# Patient Record
Sex: FEMALE | Race: WHITE | NOT HISPANIC OR LATINO | Employment: OTHER | URBAN - METROPOLITAN AREA
[De-identification: names, ages, dates, MRNs, and addresses within clinical notes are randomized per-mention and may not be internally consistent; named-entity substitution may affect disease eponyms.]

---

## 2017-12-05 ENCOUNTER — HISTORICAL (OUTPATIENT)
Dept: ADMINISTRATIVE | Facility: HOSPITAL | Age: 57
End: 2017-12-05

## 2021-03-25 ENCOUNTER — IMMUNIZATION (OUTPATIENT)
Dept: OBSTETRICS AND GYNECOLOGY | Facility: CLINIC | Age: 61
End: 2021-03-25
Payer: COMMERCIAL

## 2021-03-25 DIAGNOSIS — Z23 NEED FOR VACCINATION: Primary | ICD-10-CM

## 2021-03-25 PROCEDURE — 91300 COVID-19, MRNA, LNP-S, PF, 30 MCG/0.3 ML DOSE VACCINE: CPT | Mod: PBBFAC | Performed by: ANESTHESIOLOGY

## 2021-04-15 ENCOUNTER — IMMUNIZATION (OUTPATIENT)
Dept: OBSTETRICS AND GYNECOLOGY | Facility: CLINIC | Age: 61
End: 2021-04-15
Payer: COMMERCIAL

## 2021-04-15 DIAGNOSIS — Z23 NEED FOR VACCINATION: Primary | ICD-10-CM

## 2021-04-15 PROCEDURE — 0002A COVID-19, MRNA, LNP-S, PF, 30 MCG/0.3 ML DOSE VACCINE: CPT | Mod: CV19,,, | Performed by: ANESTHESIOLOGY

## 2021-04-15 PROCEDURE — 0002A COVID-19, MRNA, LNP-S, PF, 30 MCG/0.3 ML DOSE VACCINE: ICD-10-PCS | Mod: CV19,,, | Performed by: ANESTHESIOLOGY

## 2021-04-15 PROCEDURE — 91300 COVID-19, MRNA, LNP-S, PF, 30 MCG/0.3 ML DOSE VACCINE: CPT | Mod: ,,, | Performed by: ANESTHESIOLOGY

## 2021-04-15 PROCEDURE — 91300 COVID-19, MRNA, LNP-S, PF, 30 MCG/0.3 ML DOSE VACCINE: ICD-10-PCS | Mod: ,,, | Performed by: ANESTHESIOLOGY

## 2021-06-15 DIAGNOSIS — Z12.31 ENCOUNTER FOR SCREENING MAMMOGRAM FOR BREAST CANCER: Primary | ICD-10-CM

## 2021-06-17 ENCOUNTER — HOSPITAL ENCOUNTER (OUTPATIENT)
Dept: RADIOLOGY | Facility: HOSPITAL | Age: 61
Discharge: HOME | End: 2021-06-17
Attending: FAMILY MEDICINE
Payer: COMMERCIAL

## 2021-06-17 VITALS — HEIGHT: 61 IN | BODY MASS INDEX: 35.68 KG/M2 | WEIGHT: 189 LBS

## 2021-06-17 DIAGNOSIS — Z12.31 ENCOUNTER FOR SCREENING MAMMOGRAM FOR BREAST CANCER: ICD-10-CM

## 2021-06-17 PROCEDURE — 77067 SCR MAMMO BI INCL CAD: CPT | Mod: TC

## 2021-12-17 ENCOUNTER — IMMUNIZATION (OUTPATIENT)
Dept: OBSTETRICS AND GYNECOLOGY | Facility: CLINIC | Age: 61
End: 2021-12-17
Payer: OTHER GOVERNMENT

## 2021-12-17 DIAGNOSIS — Z23 NEED FOR VACCINATION: Primary | ICD-10-CM

## 2021-12-17 PROCEDURE — 0004A COVID-19, MRNA, LNP-S, PF, 30 MCG/0.3 ML DOSE VACCINE: CPT | Mod: PBBFAC | Performed by: ANESTHESIOLOGY

## 2022-04-07 ENCOUNTER — HISTORICAL (OUTPATIENT)
Dept: ADMINISTRATIVE | Facility: HOSPITAL | Age: 62
End: 2022-04-07
Payer: COMMERCIAL

## 2022-04-24 VITALS
DIASTOLIC BLOOD PRESSURE: 100 MMHG | SYSTOLIC BLOOD PRESSURE: 162 MMHG | HEIGHT: 61 IN | WEIGHT: 211 LBS | BODY MASS INDEX: 39.84 KG/M2

## 2022-05-01 NOTE — HISTORICAL [facility]
This is a historical note converted from Alex. Formatting and pictures may have been removed.  Please reference Alex for original formatting and attached multimedia. Chief Complaint  NEW PATIENT HERE FOR LEFT KNEE PAIN ON 10/14/17 WITH SWELLING AND STIFFNESS. 2 WEEKS LATER SHE HEARD A POP AFTER PUTTING HER SOCK  History of Present Illness  Knee symptoms ::knee gives way ?? ?Knee joint pain on the left laterally ??? ?Worse with weightbearing ?? ?Worse in the morning  ?? ?Slowly worsens with extended activity ?? ?Is increased by bending it ?? ?By twisting it ?? ?By kneeling ?? ?With stairs ?? ?By squatting  ?? ?Knee joint swelling on the left??????Medially ?? ?Laterally? ?  ? ?Knee joint pain not improved by rest ?? ?Not by ice? ?? ?No clicking sensation in the left knee ???? grating sensation in the left knee?  ?? ?The knee did not suddenly buckle due to contact ???? popping sound was heard in the knee?  ?? ?No tingling ?? ?No burning sensation,has had periods of stiffness  Review of Systems  Review of Systems  ????Constitutional: No fever, No chills.  ????Respiratory: No shortness of breath, No cough.  ????Cardiovascular: No chest pain.  ????Gastrointestinal: No nausea, No vomiting, No diarrhea, No constipation, No heartburn.  ????Genitourinary: No dysuria, No hematuria.  ????Hematology/Lymphatics: No bleeding tendency.  ????Endocrine: No polyuria.  ????Neurologic: Alert and oriented X4, No numbness, No tingling.  ????Psychiatric: No anxiety, No depression.  Physical Exam  Vitals & Measurements  HT:?154?cm? HT:?154?cm? WT:?95.70?kg? WT:?95.70?kg? BMI:?40.35?  PHYSICAL FINDINGS  Cardiovascular:  ? ?? Arterial Pulses: ? Dorsalis pedis pulses were normal right.? ? Dorsalis pedis pulses were normal left.  Musculoskeletal System:  ? ?? Hips:  ? ?? ?? ? General/bilateral: ? No swelling of the hips.? ? No induration of the hips.  ? ?? ?? ? Right Hip: ? Motion was normal.? ? No pain was elicited by active internal  rotation with the hip flexed.  ? ?? ?? ? ? No pain was elicited by active external rotation with the hip flexed.? ? No pain was elicited by active motion.? ? No pain was elicited by passive motion.  ? ?? ?? ? Left Hip: ? Motion was normal.  ? ?? Left Knee: ? Examined.? ? Positive effusion.? ?Localized swelling.? ?Genu varum.? ?Patella demonstrated crepitus.? ?Anteromedial aspect was tender on palpation.? ?Medial aspect was tender on palpation.  patella  Left Knee:  Knee Motion:? ?? ?? Value???????????  ? Active flexion? ?? ?? [125] degrees  Active extension? ? [00] degrees  ?   left knee ?? Pain was elicited throughout the range of motion.? ? Swelling with a negative fluctuation test.? ? No erythema.? ? No warmth.? ? Anterior aspect was not tender on palpation.? ? Patellofemoral region was not tender on palpation.? ? Medial collateral ligament was not tender on palpation.? ? Lateral collateral ligament was not tender on palpation.? ? No pain was elicited by motion using Nadia apprehension test.? ? No laxity of the posterior cruciate ligament.? ? No anterior drawer sign was present.? ? No one plane medial (straight) instability.? ? No one plane lateral (straight) instability.? ? A Lachman test did not demonstrate one plane anterior instability.? ? Clarkes sign was not observed for chondromalacia.  ?   Right Knee:  Knee Motion:? ?? ? Value? ?? ?? ?? ?? Normal Range  Active flexion? ?? ? [130] degrees  Active extension? ?[00] degrees  ?   Foot:  ? ?? Left Foot: ? No excessive pronation.? ? No excessive supination.  Functional Exam:  ? ?? General/bilateral: ? Ambulation did not require a cane.? ? Ambulation did not require a walker.  Neurological:  ? ?? Sensation: ? No sensory exam abnormalities were noted.  ? ?? Motor: ? Strength was normal.? ? No weakness of the right hip was observed.? ? No weakness of the left hip was observed.  ? ?? Gait and Stance: ? A left-sided antalgic gait was observed.  Skin:  Injury /  Incision Site: ? Left knee showed no tissue injury scar.  ?  ?   TESTS  Imaging:  X-Ray Knee:  AP and lateral view x-rays of the left knee with sunrise view of the patella were performed -of left knee.  ?   IMPRESSIONS RADIOLOGY TEST  Narrowing of the left knee joint space,not ?bone on bone, showed sclerosis of the left knee, and small osteophytes arising from the left knee.  will try mobic  Assessment/Plan  1.?Osteoarthritis of left knee  Ordered:  meloxicam, 7.5 mg = 1 tab(s), Oral, Daily, # 90 tab(s), 0 Refill(s), Pharmacy:  [facility] Follow up, *Est. 01/05/18 3:00:00 CST, Order for future visit, Osteoarthritis of left knee, [facility]  Office/Outpatient Visit Level 4 New [id number] PC, Osteoarthritis of left knee, [facility], 12/05/17 9:36:00 CST  ?  Left knee pain  Ordered:  XR Knee Left 3 Views, Routine, 12/05/17 8:42:00 CST, Pain, None, Ambulatory, Rad Type, Left knee pain, 12/05/17 8:42:00 CST  ?   Problem List/Past Medical History  Ongoing  High cholesterol  Hypertension  Morbid obesity  Osteoarthritis of left knee  Historical  Medications  Atorvastatin 40 Mg Tablet, 40 mg= 1 tab(s), Oral, qAM  BISOPRL/HCTZ TAB 5-6.25MG, 1 tab(s), Oral, qAM  meloxicam 7.5 mg oral tablet, 7.5 mg= 1 tab(s), Oral, Daily  TRIAMT/HCTZ TAB 75-50MG, Oral  Allergies  No Known Allergies  Social History  Alcohol - 12/05/2017  Never  Substance Abuse - 12/05/2017  Never  Tobacco - 12/05/2017  Never smoker  Family History  Cancer: Father.  Heart: Father.  Hypertension.: Mother and Father.

## 2022-07-06 ENCOUNTER — HOSPITAL ENCOUNTER (OUTPATIENT)
Dept: RADIOLOGY | Facility: CLINIC | Age: 62
Discharge: HOME | End: 2022-07-06
Attending: SPECIALIST
Payer: COMMERCIAL

## 2022-07-06 ENCOUNTER — OFFICE VISIT (OUTPATIENT)
Dept: ORTHOPEDICS | Facility: CLINIC | Age: 62
End: 2022-07-06
Payer: COMMERCIAL

## 2022-07-06 VITALS — BODY MASS INDEX: 36.63 KG/M2 | WEIGHT: 194 LBS | HEIGHT: 61 IN

## 2022-07-06 DIAGNOSIS — M17.12 PRIMARY LOCALIZED OSTEOARTHROSIS OF THE KNEE, LEFT: ICD-10-CM

## 2022-07-06 DIAGNOSIS — M25.562 LEFT KNEE PAIN, UNSPECIFIED CHRONICITY: ICD-10-CM

## 2022-07-06 DIAGNOSIS — M25.562 LEFT KNEE PAIN, UNSPECIFIED CHRONICITY: Primary | ICD-10-CM

## 2022-07-06 PROCEDURE — 1160F RVW MEDS BY RX/DR IN RCRD: CPT | Mod: CPTII,,, | Performed by: SPECIALIST

## 2022-07-06 PROCEDURE — 99213 PR OFFICE/OUTPT VISIT, EST, LEVL III, 20-29 MIN: ICD-10-PCS | Mod: 25,,, | Performed by: SPECIALIST

## 2022-07-06 PROCEDURE — 3008F PR BODY MASS INDEX (BMI) DOCUMENTED: ICD-10-PCS | Mod: CPTII,,, | Performed by: SPECIALIST

## 2022-07-06 PROCEDURE — 1159F PR MEDICATION LIST DOCUMENTED IN MEDICAL RECORD: ICD-10-PCS | Mod: CPTII,,, | Performed by: SPECIALIST

## 2022-07-06 PROCEDURE — 99213 OFFICE O/P EST LOW 20 MIN: CPT | Mod: 25,,, | Performed by: SPECIALIST

## 2022-07-06 PROCEDURE — 73564 XR KNEE COMP 4 OR MORE VIEWS LEFT: ICD-10-PCS | Mod: LT,,, | Performed by: SPECIALIST

## 2022-07-06 PROCEDURE — 1160F PR REVIEW ALL MEDS BY PRESCRIBER/CLIN PHARMACIST DOCUMENTED: ICD-10-PCS | Mod: CPTII,,, | Performed by: SPECIALIST

## 2022-07-06 PROCEDURE — 20610 LARGE JOINT ASPIRATION/INJECTION: L KNEE: ICD-10-PCS | Mod: LT,,, | Performed by: SPECIALIST

## 2022-07-06 PROCEDURE — 1159F MED LIST DOCD IN RCRD: CPT | Mod: CPTII,,, | Performed by: SPECIALIST

## 2022-07-06 PROCEDURE — 20610 DRAIN/INJ JOINT/BURSA W/O US: CPT | Mod: LT,,, | Performed by: SPECIALIST

## 2022-07-06 PROCEDURE — 3008F BODY MASS INDEX DOCD: CPT | Mod: CPTII,,, | Performed by: SPECIALIST

## 2022-07-06 PROCEDURE — 73564 X-RAY EXAM KNEE 4 OR MORE: CPT | Mod: LT,,, | Performed by: SPECIALIST

## 2022-07-06 RX ORDER — BETAMETHASONE SODIUM PHOSPHATE AND BETAMETHASONE ACETATE 3; 3 MG/ML; MG/ML
12 INJECTION, SUSPENSION INTRA-ARTICULAR; INTRALESIONAL; INTRAMUSCULAR; SOFT TISSUE
Status: DISCONTINUED | OUTPATIENT
Start: 2022-07-06 | End: 2022-07-06 | Stop reason: HOSPADM

## 2022-07-06 RX ORDER — LIDOCAINE HYDROCHLORIDE 20 MG/ML
3 INJECTION, SOLUTION INFILTRATION; PERINEURAL
Status: DISCONTINUED | OUTPATIENT
Start: 2022-07-06 | End: 2022-07-06 | Stop reason: HOSPADM

## 2022-07-06 RX ORDER — TRIAMTERENE AND HYDROCHLOROTHIAZIDE 75; 50 MG/1; MG/1
1 TABLET ORAL DAILY
COMMUNITY
Start: 2022-06-06

## 2022-07-06 RX ORDER — METOPROLOL SUCCINATE 50 MG/1
50 TABLET, EXTENDED RELEASE ORAL DAILY
COMMUNITY
Start: 2022-05-09

## 2022-07-06 RX ADMIN — LIDOCAINE HYDROCHLORIDE 3 MG: 20 INJECTION, SOLUTION INFILTRATION; PERINEURAL at 10:07

## 2022-07-06 RX ADMIN — BETAMETHASONE SODIUM PHOSPHATE AND BETAMETHASONE ACETATE 12 MG: 3; 3 INJECTION, SUSPENSION INTRA-ARTICULAR; INTRALESIONAL; INTRAMUSCULAR; SOFT TISSUE at 10:07

## 2022-07-06 NOTE — PROGRESS NOTES
Chief Complaint:   Chief Complaint   Patient presents with    Knee Pain     LEFT KNEE, AFTER LAST INJECTION WHICH WAS A YEAR AGO WITH RELIEF, A MONTH AGO OVERDOING IT WHILE CUTTING GRASS SHE GRADUALLY DEVELOPED PAIN FROM THE LATERAL SIDE,          History of present illness:    This is a 62 y.o. year old female who complains of LEFT Knee symptoms ::knee gives way  Knee joint pain on the left  Worse with weightbearing  Worse in the morning   Slowly worsens with extended activity  Is increased by bending it  By twisting it  By kneeling  With stairs  By squatting   Knee joint swelling on the left posteriorly  Medially  Laterally °  Knee joint pain not improved by rest ° Not by ice ° No clicking sensation in the left knee ° No grating sensation in the left knee   ° The knee did not suddenly buckle due to contact ° No popping sound was heard in the knee   ° No tingling ° No burning sensation        Past Medical History:   Diagnosis Date    High cholesterol     HTN (hypertension)     Obesity, unspecified     Osteoarthritis of left knee        History reviewed. No pertinent surgical history.    Current Outpatient Medications   Medication Instructions    metoprolol succinate (TOPROL-XL) 50 mg, Oral, Daily    triamterene-hydrochlorothiazide 75-50 mg (MAXZIDE) 75-50 mg per tablet 1 tablet, Oral, Daily        Review of patient's allergies indicates:  No Known Allergies    Family History   Problem Relation Age of Onset    No Known Problems Mother     No Known Problems Father     No Known Problems Sister     No Known Problems Daughter     No Known Problems Maternal Aunt     No Known Problems Maternal Uncle     No Known Problems Paternal Aunt     No Known Problems Paternal Uncle     No Known Problems Maternal Grandmother     No Known Problems Maternal Grandfather     No Known Problems Paternal Grandmother     No Known Problems Paternal Grandfather     Breast cancer Neg Hx     Ovarian cancer Neg Hx  "    BRCA 1/2 Neg Hx            Review of Systems:    Constitution:   Denies chills, fever, and sweats.  HENT:   Denies headaches or blurry vision.  Cardiovascular:  Denies chest pain or irregular heart beat.  Respiratory:   Denies cough or shortness of breath.  Gastrointestinal:  Denies abdominal pain, nausea, or vomiting.  Musculoskeletal:   Denies muscle cramps.  Neurological:   Denies dizziness or focal weakness.  Psychiatric/Behavior: Normal mental status.  Hematology/Lymph:  Denies bleeding problem or easy bruising/bleeding.  Skin:    Denies rash or suspicious lesions.    Examination:    Vital Signs:    Vitals:    07/06/22 1010   Weight: 88 kg (194 lb)   Height: 5' 1" (1.549 m)       Body mass index is 36.66 kg/m².    Constitution:   Well-developed, well nourished patient in no acute distress.  Neurological:   Alert and oriented x 3 and cooperative to examination.     Psychiatric/Behavior: Normal mental status.  Respiratory:   No shortness of breath.  Eyes:    Extraoccular muscles intact  Skin:    No scars, rash or suspicious lesions.    Musculoskeletal Exam :   PHYSICAL FINDINGS  Cardiovascular:  Arterial Pulses: ° Dorsalis pedis pulses were normal right. ° Dorsalis pedis pulses were normal left.  Musculoskeletal System:  Hips:  General/bilateral: ° No swelling of the hips. ° No induration of the hips.  Right Hip: ° Motion was normal. ° No pain was elicited by active internal rotation with the hip flexed.  ° No pain was elicited by active external rotation with the hip flexed. ° No pain was elicited by active motion. ° No pain was elicited by passive motion.  Left Hip: ° Motion was normal.  Left Knee: ° Examined. ° Positive effusion. °Localized swelling. °Genu varum. °Patella demonstrated crepitus. °Anteromedial aspect was tender on palpation. °Medial aspect was tender on palpation.  patella  Left Knee:  Knee Motion: Value   Active flexion     120 degrees  Active extension    5  degrees    left knee ° Pain " was elicited throughout the range of motion. ° Swelling with a negative fluctuation test. ° No erythema. ° No warmth. ° Anterior aspect was not tender on palpation. ° Patellofemoral region was not tender on palpation. ° Medial collateral ligament was not tender on palpation. ° Lateral collateral ligament was not tender on palpation. ° No pain was elicited by motion using [place]'s apprehension test. ° No laxity of the posterior cruciate ligament. ° No anterior drawer sign was present. ° No one plane medial (straight) instability. ° No one plane lateral (straight) instability. ° A Lachman test did not demonstrate one plane anterior instability. ° Villalba's sign was not observed for chondromalacia.      TESTS  Imaging:  X-Ray Knee:  AP and lateral view x-rays of the left knee with sunrise view of the patella were performed -of left knee.    IMPRESSIONS RADIOLOGY TEST   VARUS ALIGNMENT WITH BONE ON BONE MEDIAL AND PATELLOFEMORAL     Assessment: Left knee pain, unspecified chronicity  -     X-Ray Knee Complete 4 or More Views Left; Future; Expected date: 07/06/2022        Plan:  NSAIDS  Modify activity  Left knee injection        DISCLAIMER: This note may have been dictated using voice recognition software and may contain grammatical errors.     NOTE: Consult report sent to referring provider via [facility]MR.

## 2022-07-06 NOTE — PROCEDURES
Large Joint Aspiration/Injection: L knee    Date/Time: 7/6/2022 10:00 AM  Performed by: Bruce Bal MD  Authorized by: Bruce Bal MD     Consent Done?:  Yes (Verbal)  Indications:  Arthritis  Timeout: prior to procedure the correct patient, procedure, and site was verified    Prep: patient was prepped and draped in usual sterile fashion      Local anesthesia used?: Yes    Local anesthetic:  Lidocaine 2% without epinephrine    Details:  Needle Size:  25 G  Ultrasonic Guidance for needle placement?: No    Location:  Knee  Site:  L knee  Medications:  12 mg betamethasone acetate-betamethasone sodium phosphate 6 mg/mL; 3 mg LIDOcaine HCL 20 mg/ml (2%) 20 mg/mL (2 %)  Patient tolerance:  Patient tolerated the procedure well with no immediate complications

## 2022-08-24 DIAGNOSIS — Z12.31 ENCOUNTER FOR SCREENING MAMMOGRAM FOR MALIGNANT NEOPLASM OF BREAST: Primary | ICD-10-CM

## 2022-08-31 ENCOUNTER — HOSPITAL ENCOUNTER (OUTPATIENT)
Dept: RADIOLOGY | Facility: HOSPITAL | Age: 62
Discharge: HOME | End: 2022-08-31
Attending: FAMILY MEDICINE
Payer: COMMERCIAL

## 2022-08-31 VITALS — BODY MASS INDEX: 36.63 KG/M2 | HEIGHT: 61 IN | WEIGHT: 194 LBS

## 2022-08-31 DIAGNOSIS — Z12.31 ENCOUNTER FOR SCREENING MAMMOGRAM FOR MALIGNANT NEOPLASM OF BREAST: ICD-10-CM

## 2022-08-31 PROCEDURE — 77067 SCR MAMMO BI INCL CAD: CPT | Mod: TC

## 2022-10-19 ENCOUNTER — OFFICE VISIT (OUTPATIENT)
Dept: ORTHOPEDICS | Facility: CLINIC | Age: 62
End: 2022-10-19
Payer: COMMERCIAL

## 2022-10-19 DIAGNOSIS — S92.351A CLOSED FRACTURE OF BASE OF FIFTH METATARSAL BONE, RIGHT, INITIAL ENCOUNTER: Primary | ICD-10-CM

## 2022-10-19 PROCEDURE — 99999 PR PBB SHADOW E&M-EST. PATIENT-LVL II: ICD-10-PCS | Mod: PBBFAC,,, | Performed by: NURSE PRACTITIONER

## 2022-10-19 PROCEDURE — 1159F MED LIST DOCD IN RCRD: CPT | Mod: CPTII,S$GLB,, | Performed by: NURSE PRACTITIONER

## 2022-10-19 PROCEDURE — 28470 CLTX METATARSAL FX WO MNP EA: CPT | Mod: RT,S$GLB,, | Performed by: NURSE PRACTITIONER

## 2022-10-19 PROCEDURE — 1160F PR REVIEW ALL MEDS BY PRESCRIBER/CLIN PHARMACIST DOCUMENTED: ICD-10-PCS | Mod: CPTII,S$GLB,, | Performed by: NURSE PRACTITIONER

## 2022-10-19 PROCEDURE — 28470 PR CLOSED RX METATARSAL FX: ICD-10-PCS | Mod: RT,S$GLB,, | Performed by: NURSE PRACTITIONER

## 2022-10-19 PROCEDURE — 99499 NO LOS: ICD-10-PCS | Mod: S$GLB,,, | Performed by: NURSE PRACTITIONER

## 2022-10-19 PROCEDURE — 1159F PR MEDICATION LIST DOCUMENTED IN MEDICAL RECORD: ICD-10-PCS | Mod: CPTII,S$GLB,, | Performed by: NURSE PRACTITIONER

## 2022-10-19 PROCEDURE — 99999 PR PBB SHADOW E&M-EST. PATIENT-LVL II: CPT | Mod: PBBFAC,,, | Performed by: NURSE PRACTITIONER

## 2022-10-19 PROCEDURE — 99499 UNLISTED E&M SERVICE: CPT | Mod: S$GLB,,, | Performed by: NURSE PRACTITIONER

## 2022-10-19 PROCEDURE — 1160F RVW MEDS BY RX/DR IN RCRD: CPT | Mod: CPTII,S$GLB,, | Performed by: NURSE PRACTITIONER

## 2022-10-19 NOTE — PROGRESS NOTES
Subjective:      Beulah Aden is a 62 y.o. female who presents for treatment of a right foot fracture. She is referred by Dr Lofton. Onset of injury was on 10/16/22. She states that she stepped out out of her truck and felt a pop in her right foot. She had immediate pain. She self treated for a few days and saw her PCP earlier today. She had radiographs done and a nondisplaced fracture of the base of the fifth metatarsal was seen. She was referred to orthopedics. She presents and rates her pain as 4/10 over the lateral mid-foot. She reports minimal bruising and mild swelling. Current symptoms include: ability to bear weight, but with some pain. Aggravating factors: any weight bearing. Symptoms have been well-controlled. Patient has had no prior foot problems. Evaluation to date: plain films: abnormal. Treatment to date: none.      Review of Systems  Review of Systems   Constitutional: Negative.  Negative for fever.   Musculoskeletal:  Positive for joint pain (RT foot).   Skin: Negative.    Neurological: Negative.    Psychiatric/Behavioral: Negative.     All other systems reviewed and are negative.    Objective:   Neurologically intact.    Gait Antalgic  Right foot:  Tenderness of the base of the 5th metatarsal.  Ecchymoses and swelling of the dorsal foot.  AROM of the foot is limited.   No ankle tenderness or instability.   Left foot:  normal exam, no swelling, tenderness, instability; ligaments intact, full range of motion of all ankle/foot joints     Cap refill brisk    Imaging:  X-ray of the right foot were done off site and reviewed.   Nondisplaced fracture of the base of the fifth metatarsal.    Assessment:   Nondisplaced fracture of the base of the fifth metatarsal.    Plan:     Natural history and expected course discussed. Questions answered.  NWB with crutches x 2 weeks. Placed in a walker boot for fracture immobilization. Instructed on usage.   Rest, ice, compression, and elevation.  OTC  analgesics as needed.  RTC one week for radiographs and follow up.

## 2022-10-20 DIAGNOSIS — Z12.11 SPECIAL SCREENING FOR MALIGNANT NEOPLASMS, COLON: Primary | ICD-10-CM

## 2022-10-20 RX ORDER — SODIUM CHLORIDE 0.9 % (FLUSH) 0.9 %
10 SYRINGE (ML) INJECTION
Status: CANCELLED | OUTPATIENT
Start: 2022-10-20

## 2022-10-20 RX ORDER — SODIUM CHLORIDE 9 MG/ML
INJECTION, SOLUTION INTRAVENOUS CONTINUOUS
Status: CANCELLED | OUTPATIENT
Start: 2022-10-20

## 2022-10-24 DIAGNOSIS — S92.351A CLOSED FRACTURE OF BASE OF FIFTH METATARSAL BONE, RIGHT, INITIAL ENCOUNTER: Primary | ICD-10-CM

## 2022-10-25 ENCOUNTER — HOSPITAL ENCOUNTER (OUTPATIENT)
Dept: RADIOLOGY | Facility: HOSPITAL | Age: 62
Discharge: HOME | End: 2022-10-25
Attending: NURSE PRACTITIONER
Payer: COMMERCIAL

## 2022-10-25 ENCOUNTER — OFFICE VISIT (OUTPATIENT)
Dept: ORTHOPEDICS | Facility: CLINIC | Age: 62
End: 2022-10-25
Payer: COMMERCIAL

## 2022-10-25 DIAGNOSIS — S92.351A CLOSED FRACTURE OF BASE OF FIFTH METATARSAL BONE, RIGHT, INITIAL ENCOUNTER: Primary | ICD-10-CM

## 2022-10-25 DIAGNOSIS — S92.351A CLOSED FRACTURE OF BASE OF FIFTH METATARSAL BONE, RIGHT, INITIAL ENCOUNTER: ICD-10-CM

## 2022-10-25 PROCEDURE — 73630 X-RAY EXAM OF FOOT: CPT | Mod: TC,RT

## 2022-10-25 PROCEDURE — 99999 PR PBB SHADOW E&M-EST. PATIENT-LVL II: ICD-10-PCS | Mod: PBBFAC,,, | Performed by: NURSE PRACTITIONER

## 2022-10-25 PROCEDURE — 1159F MED LIST DOCD IN RCRD: CPT | Mod: CPTII,S$GLB,, | Performed by: NURSE PRACTITIONER

## 2022-10-25 PROCEDURE — 1159F PR MEDICATION LIST DOCUMENTED IN MEDICAL RECORD: ICD-10-PCS | Mod: CPTII,S$GLB,, | Performed by: NURSE PRACTITIONER

## 2022-10-25 PROCEDURE — 1160F PR REVIEW ALL MEDS BY PRESCRIBER/CLIN PHARMACIST DOCUMENTED: ICD-10-PCS | Mod: CPTII,S$GLB,, | Performed by: NURSE PRACTITIONER

## 2022-10-25 PROCEDURE — 99024 POSTOP FOLLOW-UP VISIT: CPT | Mod: S$GLB,,, | Performed by: NURSE PRACTITIONER

## 2022-10-25 PROCEDURE — 1160F RVW MEDS BY RX/DR IN RCRD: CPT | Mod: CPTII,S$GLB,, | Performed by: NURSE PRACTITIONER

## 2022-10-25 PROCEDURE — 99024 PR POST-OP FOLLOW-UP VISIT: ICD-10-PCS | Mod: S$GLB,,, | Performed by: NURSE PRACTITIONER

## 2022-10-25 PROCEDURE — 99999 PR PBB SHADOW E&M-EST. PATIENT-LVL II: CPT | Mod: PBBFAC,,, | Performed by: NURSE PRACTITIONER

## 2022-10-25 NOTE — PROGRESS NOTES
Subjective:      Follow up: RT foot FX     Beulah Aden is a 62 y.o. female who presents for follow up for a right fifth metatarsal fracture. Onset of injury was on 10/16/22. She presents in her walking boot and has been non-weight bearing. She presents and rates her pain as 2/10 over the lateral mid-foot. She reports minimal swelling. Aggravating factors: any weight bearing. Symptoms have been well-controlled.       Review of Systems  Review of Systems   Constitutional: Negative.  Negative for fever.   Musculoskeletal:  Positive for joint pain (RT foot).   Skin: Negative.    Neurological: Negative.    Psychiatric/Behavioral: Negative.     All other systems reviewed and are negative.     Objective:   Neurologically intact.     Gait Antalgic  Right foot:  Tenderness of the base of the 5th metatarsal.  Ecchymoses and swelling of the dorsal foot has decreased  AROM of the foot is unchanged  No ankle tenderness or instability.   Left foot:  normal exam, no swelling, tenderness, instability; ligaments intact, full range of motion of all ankle/foot joints      Cap refill brisk     Imaging:  X-ray of the right foot were done off site and reviewed.   Nondisplaced fracture proximal 5th metatarsal.     Assessment:   Nondisplaced fracture of the base of the fifth metatarsal.     Plan:      Radiographs are stable, will monitor closely. Will make referral to foot specialty for any changes.   NWB with crutches as directed. Walker boot as previous.   Rest, ice, compression, and elevation.  OTC analgesics as needed.  RTC one week for radiographs.

## 2022-10-28 ENCOUNTER — HOSPITAL ENCOUNTER (OUTPATIENT)
Dept: PREADMISSION TESTING | Facility: HOSPITAL | Age: 62
Discharge: HOME | End: 2022-10-28
Attending: SURGERY
Payer: COMMERCIAL

## 2022-10-28 ENCOUNTER — ANESTHESIA EVENT (OUTPATIENT)
Dept: ENDOSCOPY | Facility: HOSPITAL | Age: 62
End: 2022-10-28
Payer: COMMERCIAL

## 2022-10-28 VITALS — HEIGHT: 61 IN | WEIGHT: 189 LBS | BODY MASS INDEX: 35.68 KG/M2

## 2022-10-28 RX ORDER — PRAVASTATIN SODIUM 20 MG/1
20 TABLET ORAL DAILY
COMMUNITY

## 2022-10-28 NOTE — PRE-PROCEDURE INSTRUCTIONS
PT SAW DR GARCIA 8/9/22 FOR THE FIRST TIME FOR ESTABLISHED CARE. EKG DONE AT DR GARCIA'S OFFICE. RECEIVED NOTE FROM DR HERNANDEZ OFFICE WITH EKG INTERPRETATION. REVIEWED WITH DR AYERS. NO INTERVENTIONS NEEDED.

## 2022-10-31 NOTE — ANESTHESIA PREPROCEDURE EVALUATION
10/31/2022  Beulah Aden is a 62 y.o., female.      Pre-op Assessment    I have reviewed the Patient Summary Reports.    I have reviewed the NPO Status.   I have reviewed the Medications.     Review of Systems  Anesthesia Hx:  No problems with previous Anesthesia  Denies Family Hx of Anesthesia complications.   Denies Personal Hx of Anesthesia complications.   Social:  Non-Smoker    Cardiovascular:  Cardiovascular Normal Hypertension, well controlled     Pulmonary:  Pulmonary Normal    Renal/:  Renal/ Normal     Hepatic/GI:  Hepatic/GI Normal    Neurological:  Neurology Normal    Endocrine:  Endocrine Normal      Lab Results   Component Value Date    WBC 7.45 10/28/2022    HGB 14.1 10/28/2022    HCT 41.1 10/28/2022    MCV 87 10/28/2022     10/28/2022     CMP  Sodium   Date Value Ref Range Status   10/28/2022 140 136 - 145 mmol/L Final     Potassium   Date Value Ref Range Status   10/28/2022 3.3 (L) 3.5 - 5.1 mmol/L Final     Chloride   Date Value Ref Range Status   10/28/2022 101 95 - 110 mmol/L Final     CO2   Date Value Ref Range Status   10/28/2022 30 (H) 23 - 29 mmol/L Final     Glucose   Date Value Ref Range Status   10/28/2022 118 (H) 70 - 110 mg/dL Final     BUN   Date Value Ref Range Status   10/28/2022 21 8 - 23 mg/dL Final     Creatinine   Date Value Ref Range Status   10/28/2022 1.1 0.5 - 1.4 mg/dL Final     Calcium   Date Value Ref Range Status   10/28/2022 9.4 8.7 - 10.5 mg/dL Final     Total Protein   Date Value Ref Range Status   10/28/2022 7.0 6.0 - 8.4 g/dL Final     Albumin   Date Value Ref Range Status   10/28/2022 3.9 3.5 - 5.2 g/dL Final     Total Bilirubin   Date Value Ref Range Status   10/28/2022 0.7 0.1 - 1.0 mg/dL Final     Comment:     For infants and newborns, interpretation of results should be based  on gestational age, weight and in agreement with  clinical  observations.    Premature Infant recommended reference ranges:  Up to 24 hours.............<8.0 mg/dL  Up to 48 hours............<12.0 mg/dL  3-5 days..................<15.0 mg/dL  6-29 days.................<15.0 mg/dL    For patients on Eltrombopag therapy, use of Dimension [place] TBIL is   not   recommended.       Alkaline Phosphatase   Date Value Ref Range Status   10/28/2022 66 55 - 135 U/L Final     AST   Date Value Ref Range Status   10/28/2022 24 10 - 40 U/L Final     ALT   Date Value Ref Range Status   10/28/2022 25 10 - 44 U/L Final     Anion Gap   Date Value Ref Range Status   10/28/2022 9 8 - 16 mmol/L Final       Physical Exam  General: Well nourished    Airway:  Mallampati: II / I  Mouth Opening: Normal  TM Distance: Normal  Tongue: Normal  Neck ROM: Normal ROM    Dental:  Intact    Chest/Lungs:  Clear to auscultation    Heart:  Rate: Normal  Rhythm: Regular Rhythm  Sounds: Normal        Anesthesia Plan  Type of Anesthesia, risks & benefits discussed:    Anesthesia Type: MAC  Intra-op Monitoring Plan: Standard ASA Monitors  Post Op Pain Control Plan: multimodal analgesia  Induction:  IV  Airway Plan: Direct  Informed Consent: Informed consent signed with the Patient and all parties understand the risks and agree with anesthesia plan.  All questions answered.   ASA Score: 2  Day of Surgery Review of History & Physical: I have interviewed and examined the patient. I have reviewed the patient's H&P dated: There are no significant changes.     Ready For Surgery From Anesthesia Perspective.     .

## 2022-11-01 ENCOUNTER — HOSPITAL ENCOUNTER (OUTPATIENT)
Facility: HOSPITAL | Age: 62
Discharge: HOME | End: 2022-11-01
Attending: SURGERY | Admitting: SURGERY
Payer: COMMERCIAL

## 2022-11-01 ENCOUNTER — ANESTHESIA (OUTPATIENT)
Dept: ENDOSCOPY | Facility: HOSPITAL | Age: 62
End: 2022-11-01
Payer: COMMERCIAL

## 2022-11-01 VITALS
RESPIRATION RATE: 20 BRPM | DIASTOLIC BLOOD PRESSURE: 79 MMHG | HEART RATE: 62 BPM | OXYGEN SATURATION: 99 % | TEMPERATURE: 98 F | SYSTOLIC BLOOD PRESSURE: 136 MMHG

## 2022-11-01 DIAGNOSIS — Z12.11 SPECIAL SCREENING FOR MALIGNANT NEOPLASMS, COLON: Primary | ICD-10-CM

## 2022-11-01 DIAGNOSIS — K57.30 DIVERTICULOSIS LARGE INTESTINE W/O PERFORATION OR ABSCESS W/O BLEEDING: ICD-10-CM

## 2022-11-01 PROCEDURE — 37000009 HC ANESTHESIA EA ADD 15 MINS: Performed by: SURGERY

## 2022-11-01 PROCEDURE — G0121 COLON CA SCRN NOT HI RSK IND: HCPCS | Performed by: SURGERY

## 2022-11-01 PROCEDURE — 25000003 PHARM REV CODE 250: Performed by: SURGERY

## 2022-11-01 PROCEDURE — 37000008 HC ANESTHESIA 1ST 15 MINUTES: Performed by: SURGERY

## 2022-11-01 RX ORDER — SODIUM CHLORIDE 0.9 % (FLUSH) 0.9 %
10 SYRINGE (ML) INJECTION
Status: DISCONTINUED | OUTPATIENT
Start: 2022-11-01 | End: 2022-11-01 | Stop reason: HOSPADM

## 2022-11-01 RX ORDER — SODIUM CHLORIDE 9 MG/ML
INJECTION, SOLUTION INTRAVENOUS CONTINUOUS
Status: CANCELLED | OUTPATIENT
Start: 2022-11-01

## 2022-11-01 RX ORDER — PROPOFOL 10 MG/ML
VIAL (ML) INTRAVENOUS
Status: DISCONTINUED | OUTPATIENT
Start: 2022-11-01 | End: 2022-11-01

## 2022-11-01 RX ORDER — SODIUM CHLORIDE 9 MG/ML
INJECTION, SOLUTION INTRAVENOUS CONTINUOUS
Status: DISCONTINUED | OUTPATIENT
Start: 2022-11-01 | End: 2022-11-01 | Stop reason: HOSPADM

## 2022-11-01 RX ADMIN — Medication 100 MG: at 08:11

## 2022-11-01 RX ADMIN — Medication 50 MG: at 08:11

## 2022-11-01 RX ADMIN — SODIUM CHLORIDE: 0.9 INJECTION, SOLUTION INTRAVENOUS at 07:11

## 2022-11-01 NOTE — DISCHARGE INSTRUCTIONS
FOLLOW UP WITH DR CARLSON AS SCHEDULED.10-14 DAYS    REST TODAY RESUME ACTIVITY AS TOLERATED TOMORROW.    RESUME HOME MEDICATIONS.  RESUME CURRENT DIET    NO DRIVING OR DRINKING ALCOHOL FOR 24 HOURS.    CALL DR CARLSON'S OFFICE FOR ANY QUESTIONS OR CONCERNS.  REPORT TO THE ER IF URGENT.    THANK YOU FOR CHOOSING [facility]!

## 2022-11-01 NOTE — OP NOTE
[place] - Endoscopy  Colonoscopy Procedure  Operative Note    SUMMARY     Date of Procedure: 11/1/2022     Procedure: Procedure(s) (LRB):  COLONOSCOPY (N/A)    Surgeon(s) and Role:     * Noelle Martínez MD - Primary    Assisting Surgeon: None     Patient location: GI    Pre-Operative Diagnosis: Special screening for malignant neoplasms, colon [Z12.11]    Post-Operative Diagnosis: Post-Op Diagnosis Codes:     * Special screening for malignant neoplasms, colon [Z12.11]  Diverticular disease     Indications:  Screening age for colon cancer          Procedure:                  The patient was brought in to the endoscopy suite where the risks, benefits, and alternatives of the procedure were described.  The patient was given the opportunity to ask questions and then signed informed consent.  Patient was positioned in the left lateral decubitus position, continuous monitoring was initiated, and supplemental oxygen was provided via nasal cannula.  Adequate sedation was achieved with the above mentioned medications and then titrated during the entire procedure.  Digital rectal exam was performed.  Under direct visualization the colonoscope was introduced through the anus in to the rectum.  The scope was then advanced to the cecum, which was identified by the ileocecal valve and appendiceal orifice.  Scope was then withdrawn and the mucosa was carefully examined in a circular fashion.  The entire colonic mucosa was examined.  Air was evacuated from the colon and the procedure was terminated.  The patient tolerated the procedure well and was able to be transferred to the recovery area in stable condition.    Findings:                 Digital rectal examination:  No palpable abnormality or significant hemorrhoidal disease                    Rectum:  No mucosal abnormalities                    Sigmoid:  Diffuse severe diverticular disease with large and small diverticuli        Descending:  Scattered diverticular  disease         Transverse:  No mucosal abnormalities         Ascending:  Large mouth diverticula        Cecum:  No mucosal abnormalities.  Appendiceal orifice and ileocecal valve appreciated.        Terminal Ileum:  Not intubated     Specimens:   Specimen (24h ago, onward)      None              Estimated Blood Loss (EBL): * No values recorded between 11/1/2022  7:59 AM and 11/1/2022  8:40 AM *     Complications: No     Diagnostic Impression:  Diverticulosis     Recommendations: Discharge patient to home. Patient has a contact number available for emergencies. The signs and symptoms of potential delayed complications were discussed with the patient. Return to normal activities tomorrow. Written discharge instructions were provided to the patient. Resume previous diet. Continue present medications. Repeat procedure in 10 years for screening.    Disposition:  Recovery unit stable     Attestation: I performed the procedure.        Follow Up:             Future Appointments   Date Time Provider Department Center   11/3/2022 10:00 AM Ariel Baires NP [facility]           Noelle Martínez MD  11/1/2022

## 2022-11-01 NOTE — DISCHARGE SUMMARY
Discharge Summary  General Surgery      Admit Date: 11/1/2022    Discharge Date :11/1/2022    Attending Physician: Noelle Martínez     Discharge Physician: Noelle Martínez    Discharged Condition: good    Discharge Diagnosis: Special screening for malignant neoplasms, colon [Z12.11]  Diverticular disease    Treatments/Procedures: Procedure(s) (LRB):  COLONOSCOPY (N/A)    Hospital Course: Uneventful; Discharged home from Recovery    Significant Diagnostic Studies: none    Disposition: Home or Self Care    Diet:  Diverticular precautions    Follow up: Office 10-14 days    Activity: No restriction.    Patient Instructions:   Current Discharge Medication List        CONTINUE these medications which have NOT CHANGED    Details   metoprolol succinate (TOPROL-XL) 50 MG 24 hr tablet Take 50 mg by mouth once daily.      triamterene-hydrochlorothiazide 75-50 mg (MAXZIDE) 75-50 mg per tablet Take 1 tablet by mouth once daily.      pravastatin (PRAVACHOL) 20 MG tablet Take 20 mg by mouth once daily.             Discharge Procedure Orders   Diet general   Order Comments: Diverticular precautions     Call MD for:  temperature >100.4     Call MD for:  persistent nausea and vomiting     Call MD for:  difficulty breathing, headache or visual disturbances     Activity as tolerated

## 2022-11-01 NOTE — ANESTHESIA POSTPROCEDURE EVALUATION
Anesthesia Post Evaluation    Patient: Beulah Aden    Procedure(s) Performed: Procedure(s) (LRB):  COLONOSCOPY (N/A)    Final Anesthesia Type: MAC      Patient location during evaluation: OPS  Patient participation: Yes- Able to Participate  Level of consciousness: awake  Post-procedure vital signs: reviewed and stable  Pain management: adequate  Airway patency: patent    PONV status at discharge: No PONV  Anesthetic complications: no      Cardiovascular status: blood pressure returned to baseline  Respiratory status: spontaneous ventilation  Hydration status: euvolemic  Follow-up not needed.          Vitals Value Taken Time   /79 11/01/22 0905   Temp 36.4 °C (97.5 °F) 11/01/22 0905   Pulse 62 11/01/22 0905   Resp 20 11/01/22 0905   SpO2 99 % 11/01/22 0905         No case tracking events are documented in the log.      Pain/Gregor Score: Gregor Score: 10 (11/1/2022  9:05 AM)

## 2022-11-01 NOTE — TRANSFER OF CARE
Anesthesia Transfer of Care Note    Patient: Beulah Aden    Procedure(s) Performed: Procedure(s) (LRB):  COLONOSCOPY (N/A)    Patient location: GI    Anesthesia Type: MAC    Transport from OR: Transported from OR on room air with adequate spontaneous ventilation    Post pain: adequate analgesia    Post assessment: no apparent anesthetic complications    Post vital signs: stable    Level of consciousness: awake    Nausea/Vomiting: no nausea/vomiting    Complications: none    Transfer of care protocol was followed      Last vitals:   123/66  16 RR  65 HR  98% O2 sat

## 2022-11-02 DIAGNOSIS — S92.351A CLOSED FRACTURE OF BASE OF FIFTH METATARSAL BONE, RIGHT, INITIAL ENCOUNTER: Primary | ICD-10-CM

## 2022-11-03 ENCOUNTER — OFFICE VISIT (OUTPATIENT)
Dept: ORTHOPEDICS | Facility: CLINIC | Age: 62
End: 2022-11-03
Payer: COMMERCIAL

## 2022-11-03 ENCOUNTER — HOSPITAL ENCOUNTER (OUTPATIENT)
Dept: RADIOLOGY | Facility: HOSPITAL | Age: 62
Discharge: HOME | End: 2022-11-03
Attending: NURSE PRACTITIONER
Payer: COMMERCIAL

## 2022-11-03 DIAGNOSIS — S92.351A CLOSED FRACTURE OF BASE OF FIFTH METATARSAL BONE, RIGHT, INITIAL ENCOUNTER: Primary | ICD-10-CM

## 2022-11-03 DIAGNOSIS — S92.351A CLOSED FRACTURE OF BASE OF FIFTH METATARSAL BONE, RIGHT, INITIAL ENCOUNTER: ICD-10-CM

## 2022-11-03 PROCEDURE — 99024 PR POST-OP FOLLOW-UP VISIT: ICD-10-PCS | Mod: S$GLB,,, | Performed by: NURSE PRACTITIONER

## 2022-11-03 PROCEDURE — 1159F MED LIST DOCD IN RCRD: CPT | Mod: CPTII,S$GLB,, | Performed by: NURSE PRACTITIONER

## 2022-11-03 PROCEDURE — 99999 PR PBB SHADOW E&M-EST. PATIENT-LVL II: CPT | Mod: PBBFAC,,, | Performed by: NURSE PRACTITIONER

## 2022-11-03 PROCEDURE — 1160F PR REVIEW ALL MEDS BY PRESCRIBER/CLIN PHARMACIST DOCUMENTED: ICD-10-PCS | Mod: CPTII,S$GLB,, | Performed by: NURSE PRACTITIONER

## 2022-11-03 PROCEDURE — 1160F RVW MEDS BY RX/DR IN RCRD: CPT | Mod: CPTII,S$GLB,, | Performed by: NURSE PRACTITIONER

## 2022-11-03 PROCEDURE — 1159F PR MEDICATION LIST DOCUMENTED IN MEDICAL RECORD: ICD-10-PCS | Mod: CPTII,S$GLB,, | Performed by: NURSE PRACTITIONER

## 2022-11-03 PROCEDURE — 73630 X-RAY EXAM OF FOOT: CPT | Mod: TC,RT

## 2022-11-03 PROCEDURE — 99024 POSTOP FOLLOW-UP VISIT: CPT | Mod: S$GLB,,, | Performed by: NURSE PRACTITIONER

## 2022-11-03 PROCEDURE — 99999 PR PBB SHADOW E&M-EST. PATIENT-LVL II: ICD-10-PCS | Mod: PBBFAC,,, | Performed by: NURSE PRACTITIONER

## 2022-11-03 NOTE — PROGRESS NOTES
Subjective:      Follow up: RT foot FX      Beulah Aden is a 62 y.o. female who presents for follow up for a right fifth metatarsal fracture. Onset of injury was on 10/16/22. She presents in her walking boot and has been non-weight bearing. She presents and rates her pain as 1/10 over the lateral mid-foot. She reports minimal swelling. Aggravating factors: any weight bearing. Symptoms have been well-controlled.       Review of Systems  Review of Systems   Constitutional: Negative.  Negative for fever.   Musculoskeletal:  Positive for joint pain (RT foot).   Skin: Negative.    Neurological: Negative.    Psychiatric/Behavioral: Negative.     All other systems reviewed and are negative.     Objective:   Neurologically intact.     Gait Antalgic  Right foot:  Tenderness of the base of the 5th metatarsal- mild.  Ecchymoses and swelling of the dorsal foot is resolving.  AROM of the foot is unchanged  No ankle tenderness or instability.   Left foot:  normal exam, no swelling, tenderness, instability; ligaments intact, full range of motion of all ankle/foot joints      Cap refill brisk     Imaging:  X-ray of the right foot were done and reviewed.   Nondisplaced fracture proximal 5th metatarsal. No interval changes     Assessment:   Nondisplaced fracture of the base of the fifth metatarsal.     Plan:      Radiographs are stable, will monitor closely. Will make referral to foot specialty for any changes.   NWB with crutches x 6 weeks. Walker boot as previous. Will progress WBAT slowly at 6 weeks if no tenderness. Consider bone stim if needed.   Rest, ice, compression, and elevation.  OTC analgesics as needed.  RTC as directed for radiographs.

## 2022-11-08 DIAGNOSIS — S92.351A CLOSED FRACTURE OF BASE OF FIFTH METATARSAL BONE, RIGHT, INITIAL ENCOUNTER: Primary | ICD-10-CM

## 2022-11-09 ENCOUNTER — OFFICE VISIT (OUTPATIENT)
Dept: ORTHOPEDICS | Facility: CLINIC | Age: 62
End: 2022-11-09
Payer: COMMERCIAL

## 2022-11-09 ENCOUNTER — HOSPITAL ENCOUNTER (OUTPATIENT)
Dept: RADIOLOGY | Facility: HOSPITAL | Age: 62
Discharge: HOME | End: 2022-11-09
Attending: NURSE PRACTITIONER
Payer: COMMERCIAL

## 2022-11-09 DIAGNOSIS — S92.351A CLOSED FRACTURE OF BASE OF FIFTH METATARSAL BONE, RIGHT, INITIAL ENCOUNTER: Primary | ICD-10-CM

## 2022-11-09 DIAGNOSIS — S92.351A CLOSED FRACTURE OF BASE OF FIFTH METATARSAL BONE, RIGHT, INITIAL ENCOUNTER: ICD-10-CM

## 2022-11-09 PROCEDURE — 73630 X-RAY EXAM OF FOOT: CPT | Mod: TC,RT

## 2022-11-09 PROCEDURE — 1159F MED LIST DOCD IN RCRD: CPT | Mod: CPTII,S$GLB,, | Performed by: NURSE PRACTITIONER

## 2022-11-09 PROCEDURE — 99024 PR POST-OP FOLLOW-UP VISIT: ICD-10-PCS | Mod: S$GLB,,, | Performed by: NURSE PRACTITIONER

## 2022-11-09 PROCEDURE — 1160F PR REVIEW ALL MEDS BY PRESCRIBER/CLIN PHARMACIST DOCUMENTED: ICD-10-PCS | Mod: CPTII,S$GLB,, | Performed by: NURSE PRACTITIONER

## 2022-11-09 PROCEDURE — 1160F RVW MEDS BY RX/DR IN RCRD: CPT | Mod: CPTII,S$GLB,, | Performed by: NURSE PRACTITIONER

## 2022-11-09 PROCEDURE — 99999 PR PBB SHADOW E&M-EST. PATIENT-LVL II: CPT | Mod: PBBFAC,,, | Performed by: NURSE PRACTITIONER

## 2022-11-09 PROCEDURE — 99999 PR PBB SHADOW E&M-EST. PATIENT-LVL II: ICD-10-PCS | Mod: PBBFAC,,, | Performed by: NURSE PRACTITIONER

## 2022-11-09 PROCEDURE — 1159F PR MEDICATION LIST DOCUMENTED IN MEDICAL RECORD: ICD-10-PCS | Mod: CPTII,S$GLB,, | Performed by: NURSE PRACTITIONER

## 2022-11-09 PROCEDURE — 99024 POSTOP FOLLOW-UP VISIT: CPT | Mod: S$GLB,,, | Performed by: NURSE PRACTITIONER

## 2022-11-09 NOTE — PROGRESS NOTES
Subjective:      Follow up: RT foot FX      Beulah Aden is a 62 y.o. female who presents for follow up for a right fifth metatarsal fracture. Onset of injury was on 10/16/22. She presents in her walking boot and has been non-weight bearing as directed. She presents and rates her pain as 1/10 over the lateral mid-foot. She reports minimal swelling. Aggravating factors: any weight bearing. Symptoms have been well-controlled.       Review of Systems  Review of Systems   Constitutional: Negative.  Negative for fever.   Musculoskeletal:  Positive for joint pain (RT foot).   Skin: Negative.    Neurological: Negative.    Psychiatric/Behavioral: Negative.     All other systems reviewed and are negative.     Objective:   Neurologically intact.     Gait Antalgic  Right foot:  Tenderness of the base of the 5th metatarsal- minimal.  Ecchymoses and swelling of the dorsal foot is resolving.  AROM of the foot is unchanged  No ankle tenderness or instability.   Left foot:  normal exam, no swelling, tenderness, instability; ligaments intact, full range of motion of all ankle/foot joints      Cap refill brisk     Imaging:  X-ray of the right foot were done and reviewed.   Slight interval healing of proximal 5th metatarsal fracture with a persistent fracture plane noted.     Assessment:   Nondisplaced fracture of the base of the fifth metatarsal.     Plan:      Radiographs are stable.  NWB with crutches x 6 weeks. Walker boot as previous. Will progress WBAT slowly at 6 weeks if no tenderness. Consider bone stim if needed.   Rest, ice, compression, and elevation.  OTC analgesics as needed.  RTC in 3 weeks which will be her 6 week post injury.

## 2022-11-23 DIAGNOSIS — S92.351A CLOSED FRACTURE OF BASE OF FIFTH METATARSAL BONE, RIGHT, INITIAL ENCOUNTER: Primary | ICD-10-CM

## 2022-11-28 ENCOUNTER — HOSPITAL ENCOUNTER (OUTPATIENT)
Dept: RADIOLOGY | Facility: HOSPITAL | Age: 62
Discharge: HOME | End: 2022-11-28
Attending: NURSE PRACTITIONER
Payer: COMMERCIAL

## 2022-11-28 ENCOUNTER — OFFICE VISIT (OUTPATIENT)
Dept: ORTHOPEDICS | Facility: CLINIC | Age: 62
End: 2022-11-28
Payer: COMMERCIAL

## 2022-11-28 DIAGNOSIS — S92.351A CLOSED FRACTURE OF BASE OF FIFTH METATARSAL BONE, RIGHT, INITIAL ENCOUNTER: ICD-10-CM

## 2022-11-28 DIAGNOSIS — S92.351A CLOSED FRACTURE OF BASE OF FIFTH METATARSAL BONE, RIGHT, INITIAL ENCOUNTER: Primary | ICD-10-CM

## 2022-11-28 PROCEDURE — 99024 PR POST-OP FOLLOW-UP VISIT: ICD-10-PCS | Mod: S$GLB,,, | Performed by: NURSE PRACTITIONER

## 2022-11-28 PROCEDURE — 1159F PR MEDICATION LIST DOCUMENTED IN MEDICAL RECORD: ICD-10-PCS | Mod: CPTII,S$GLB,, | Performed by: NURSE PRACTITIONER

## 2022-11-28 PROCEDURE — 1160F PR REVIEW ALL MEDS BY PRESCRIBER/CLIN PHARMACIST DOCUMENTED: ICD-10-PCS | Mod: CPTII,S$GLB,, | Performed by: NURSE PRACTITIONER

## 2022-11-28 PROCEDURE — 73630 X-RAY EXAM OF FOOT: CPT | Mod: TC,RT

## 2022-11-28 PROCEDURE — 99999 PR PBB SHADOW E&M-EST. PATIENT-LVL II: CPT | Mod: PBBFAC,,, | Performed by: NURSE PRACTITIONER

## 2022-11-28 PROCEDURE — 4010F ACE/ARB THERAPY RXD/TAKEN: CPT | Mod: CPTII,S$GLB,, | Performed by: NURSE PRACTITIONER

## 2022-11-28 PROCEDURE — 1160F RVW MEDS BY RX/DR IN RCRD: CPT | Mod: CPTII,S$GLB,, | Performed by: NURSE PRACTITIONER

## 2022-11-28 PROCEDURE — 4010F PR ACE/ARB THEARPY RXD/TAKEN: ICD-10-PCS | Mod: CPTII,S$GLB,, | Performed by: NURSE PRACTITIONER

## 2022-11-28 PROCEDURE — 1159F MED LIST DOCD IN RCRD: CPT | Mod: CPTII,S$GLB,, | Performed by: NURSE PRACTITIONER

## 2022-11-28 PROCEDURE — 99999 PR PBB SHADOW E&M-EST. PATIENT-LVL II: ICD-10-PCS | Mod: PBBFAC,,, | Performed by: NURSE PRACTITIONER

## 2022-11-28 PROCEDURE — 99024 POSTOP FOLLOW-UP VISIT: CPT | Mod: S$GLB,,, | Performed by: NURSE PRACTITIONER

## 2022-11-28 NOTE — PROGRESS NOTES
Subjective:      Follow up: RT foot FX      Beulah Aden is a 62 y.o. female who presents for a 6 week post injury follow up for a right fifth metatarsal fracture. Onset of injury was on 10/16/22. She presents in her walking boot and has been non-weight bearing as directed. She presents and rates her pain as 0/10 over the lateral mid-foot. She reports no swelling. Aggravating factors: any weight bearing. Symptoms have been well-controlled.       Review of Systems  Review of Systems   Constitutional: Negative.  Negative for fever.   Musculoskeletal:  Positive for joint pain (RT foot).   Skin: Negative.    Neurological: Negative.    Psychiatric/Behavioral: Negative.     All other systems reviewed and are negative.     Objective:   Neurologically intact.     Gait Antalgic  Right foot:  Tenderness of the base of the 5th metatarsal- minimal.  Ecchymoses and swelling of the dorsal foot is resolved.  AROM of the foot is unchanged  No ankle tenderness or instability.   Left foot:  normal exam, no swelling, tenderness, instability; ligaments intact, full range of motion of all ankle/foot joints      Cap refill brisk     Imaging:  X-ray of the right foot were done and reviewed.   Some bony reaction consistent with some interval healing of the fracture of the base of the 5th metatarsal with persistent lucency seen     Assessment:   Nondisplaced fracture of the base of the fifth metatarsal.     Plan:      Radiographs are stable.  Walker boot as previous. Will progress WBAT slowly over the next 4 weeks as directed.    Rest, ice, compression, and elevation as previous.   OTC analgesics as needed.  RTC in 4 weeks which will be 10 weeks post injury.

## 2022-12-28 ENCOUNTER — OFFICE VISIT (OUTPATIENT)
Dept: ORTHOPEDICS | Facility: CLINIC | Age: 62
End: 2022-12-28
Payer: COMMERCIAL

## 2022-12-28 ENCOUNTER — HOSPITAL ENCOUNTER (OUTPATIENT)
Dept: RADIOLOGY | Facility: HOSPITAL | Age: 62
Discharge: HOME | End: 2022-12-28
Attending: NURSE PRACTITIONER
Payer: COMMERCIAL

## 2022-12-28 DIAGNOSIS — S92.351A CLOSED FRACTURE OF BASE OF FIFTH METATARSAL BONE, RIGHT, INITIAL ENCOUNTER: Primary | ICD-10-CM

## 2022-12-28 DIAGNOSIS — S92.351A CLOSED FRACTURE OF BASE OF FIFTH METATARSAL BONE, RIGHT, INITIAL ENCOUNTER: ICD-10-CM

## 2022-12-28 PROCEDURE — 1160F RVW MEDS BY RX/DR IN RCRD: CPT | Mod: CPTII,S$GLB,, | Performed by: NURSE PRACTITIONER

## 2022-12-28 PROCEDURE — 4010F ACE/ARB THERAPY RXD/TAKEN: CPT | Mod: CPTII,S$GLB,, | Performed by: NURSE PRACTITIONER

## 2022-12-28 PROCEDURE — 1160F PR REVIEW ALL MEDS BY PRESCRIBER/CLIN PHARMACIST DOCUMENTED: ICD-10-PCS | Mod: CPTII,S$GLB,, | Performed by: NURSE PRACTITIONER

## 2022-12-28 PROCEDURE — 99024 PR POST-OP FOLLOW-UP VISIT: ICD-10-PCS | Mod: S$GLB,,, | Performed by: NURSE PRACTITIONER

## 2022-12-28 PROCEDURE — 99999 PR PBB SHADOW E&M-EST. PATIENT-LVL II: ICD-10-PCS | Mod: PBBFAC,,, | Performed by: NURSE PRACTITIONER

## 2022-12-28 PROCEDURE — 4010F PR ACE/ARB THEARPY RXD/TAKEN: ICD-10-PCS | Mod: CPTII,S$GLB,, | Performed by: NURSE PRACTITIONER

## 2022-12-28 PROCEDURE — 1159F MED LIST DOCD IN RCRD: CPT | Mod: CPTII,S$GLB,, | Performed by: NURSE PRACTITIONER

## 2022-12-28 PROCEDURE — 99024 POSTOP FOLLOW-UP VISIT: CPT | Mod: S$GLB,,, | Performed by: NURSE PRACTITIONER

## 2022-12-28 PROCEDURE — 99999 PR PBB SHADOW E&M-EST. PATIENT-LVL II: CPT | Mod: PBBFAC,,, | Performed by: NURSE PRACTITIONER

## 2022-12-28 PROCEDURE — 1159F PR MEDICATION LIST DOCUMENTED IN MEDICAL RECORD: ICD-10-PCS | Mod: CPTII,S$GLB,, | Performed by: NURSE PRACTITIONER

## 2022-12-28 PROCEDURE — 73630 X-RAY EXAM OF FOOT: CPT | Mod: TC,RT

## 2022-12-28 NOTE — PROGRESS NOTES
Subjective:      Follow up: RT foot FX      Beulah Aden is a 62 y.o. female who presents for a 10 week post injury follow up for a right fifth metatarsal fracture. Onset of injury was on 10/16/22. She presents in her walking boot and has been partial-weight bearing as directed. She presents and rates her pain as 0/10 over the lateral mid-foot. She reports no swelling. Aggravating factors: any weight bearing. Symptoms have been well-controlled.       Review of Systems  Review of Systems   Constitutional: Negative.  Negative for fever.   Musculoskeletal:  Positive for joint pain (RT foot).   Skin: Negative.    Neurological: Negative.    Psychiatric/Behavioral: Negative.     All other systems reviewed and are negative.     Objective:   Neurologically intact.     Gait Antalgic  Right foot:  Resolved tenderness of the base of the 5th metatarsal.  No Ecchymoses and swelling of the dorsal foot.  AROM of the foot is improved.  No ankle tenderness or instability.   Left foot:  normal exam, no swelling, tenderness, instability; ligaments intact, full range of motion of all ankle/foot joints      Cap refill brisk     Imaging:  X-ray of the right foot were done and reviewed.   The nondisplaced fracture through the proximal aspect of the 5th metatarsal is slightly less conspicuous, suggesting interval healing.      Assessment:   Nondisplaced fracture of the base of the fifth metatarsal with interval healing.      Plan:      Radiographs are stable.  Walker boot as previous. WBAT in boot x 2 more weeks then ween as directed.     OTC analgesics as needed.  RTC in 4 weeks for a final check and radiographs.

## 2023-01-26 DIAGNOSIS — S92.351A CLOSED FRACTURE OF BASE OF FIFTH METATARSAL BONE, RIGHT, INITIAL ENCOUNTER: Primary | ICD-10-CM

## 2023-01-27 ENCOUNTER — HOSPITAL ENCOUNTER (OUTPATIENT)
Dept: RADIOLOGY | Facility: HOSPITAL | Age: 63
Discharge: HOME | End: 2023-01-27
Attending: NURSE PRACTITIONER
Payer: COMMERCIAL

## 2023-01-27 ENCOUNTER — OFFICE VISIT (OUTPATIENT)
Dept: ORTHOPEDICS | Facility: CLINIC | Age: 63
End: 2023-01-27
Payer: COMMERCIAL

## 2023-01-27 DIAGNOSIS — S92.351A CLOSED FRACTURE OF BASE OF FIFTH METATARSAL BONE, RIGHT, INITIAL ENCOUNTER: ICD-10-CM

## 2023-01-27 DIAGNOSIS — S92.354D CLOSED NONDISPLACED FRACTURE OF FIFTH METATARSAL BONE OF RIGHT FOOT WITH ROUTINE HEALING, SUBSEQUENT ENCOUNTER: Primary | ICD-10-CM

## 2023-01-27 PROCEDURE — 99213 PR OFFICE/OUTPT VISIT, EST, LEVL III, 20-29 MIN: ICD-10-PCS | Mod: S$GLB,,, | Performed by: NURSE PRACTITIONER

## 2023-01-27 PROCEDURE — 1160F RVW MEDS BY RX/DR IN RCRD: CPT | Mod: CPTII,S$GLB,, | Performed by: NURSE PRACTITIONER

## 2023-01-27 PROCEDURE — 99999 PR PBB SHADOW E&M-EST. PATIENT-LVL II: CPT | Mod: PBBFAC,,, | Performed by: NURSE PRACTITIONER

## 2023-01-27 PROCEDURE — 73630 X-RAY EXAM OF FOOT: CPT | Mod: TC,RT

## 2023-01-27 PROCEDURE — 4010F PR ACE/ARB THEARPY RXD/TAKEN: ICD-10-PCS | Mod: CPTII,S$GLB,, | Performed by: NURSE PRACTITIONER

## 2023-01-27 PROCEDURE — 1160F PR REVIEW ALL MEDS BY PRESCRIBER/CLIN PHARMACIST DOCUMENTED: ICD-10-PCS | Mod: CPTII,S$GLB,, | Performed by: NURSE PRACTITIONER

## 2023-01-27 PROCEDURE — 4010F ACE/ARB THERAPY RXD/TAKEN: CPT | Mod: CPTII,S$GLB,, | Performed by: NURSE PRACTITIONER

## 2023-01-27 PROCEDURE — 1159F MED LIST DOCD IN RCRD: CPT | Mod: CPTII,S$GLB,, | Performed by: NURSE PRACTITIONER

## 2023-01-27 PROCEDURE — 99999 PR PBB SHADOW E&M-EST. PATIENT-LVL II: ICD-10-PCS | Mod: PBBFAC,,, | Performed by: NURSE PRACTITIONER

## 2023-01-27 PROCEDURE — 1159F PR MEDICATION LIST DOCUMENTED IN MEDICAL RECORD: ICD-10-PCS | Mod: CPTII,S$GLB,, | Performed by: NURSE PRACTITIONER

## 2023-01-27 PROCEDURE — 99213 OFFICE O/P EST LOW 20 MIN: CPT | Mod: S$GLB,,, | Performed by: NURSE PRACTITIONER

## 2023-01-27 NOTE — PROGRESS NOTES
Subjective:      Follow up: RT foot FX      Beulah Aden is a 62 y.o. female who presents for a >3 month post injury follow up for a right fifth metatarsal fracture. Onset of injury was on 10/16/22. She presents in her normal shoe wear and has been weight bearing as directed. She presents and rates her pain as 0/10 over the lateral mid-foot. She reports mild ache at times. She reports no swelling. Symptoms have been well-controlled.       Review of Systems  Review of Systems   Constitutional: Negative.  Negative for fever.   Musculoskeletal:  Positive for joint pain (RT foot).   Skin: Negative.    Neurological: Negative.    Psychiatric/Behavioral: Negative.     All other systems reviewed and are negative.     Objective:   Neurologically intact.     Gait Antalgic  Right foot:  Resolved tenderness of the base of the 5th metatarsal.  No ecchymoses and swelling of the dorsal foot.  AROM of the foot/ankle is improved.  No ankle tenderness or instability.   Left foot:  normal exam, no swelling, tenderness, instability; ligaments intact, full range of motion of all ankle/foot joints      Cap refill brisk     Imaging:  X-ray of the right foot were done and reviewed.   The nondisplaced fracture through the proximal aspect of the 5th metatarsal is with interval healing.      Assessment:   Nondisplaced fracture of the base of the fifth metatarsal with interval healing.      Plan:      Radiographs are stable.  She is clinically improved.   WBAT.    RTC prn.

## 2023-03-08 ENCOUNTER — HOSPITAL ENCOUNTER (OUTPATIENT)
Dept: RADIOLOGY | Facility: CLINIC | Age: 63
Discharge: HOME | End: 2023-03-08
Attending: SPECIALIST
Payer: COMMERCIAL

## 2023-03-08 ENCOUNTER — OFFICE VISIT (OUTPATIENT)
Dept: ORTHOPEDICS | Facility: CLINIC | Age: 63
End: 2023-03-08
Payer: COMMERCIAL

## 2023-03-08 VITALS
DIASTOLIC BLOOD PRESSURE: 90 MMHG | HEIGHT: 61 IN | HEART RATE: 66 BPM | WEIGHT: 201.63 LBS | BODY MASS INDEX: 38.07 KG/M2 | SYSTOLIC BLOOD PRESSURE: 147 MMHG

## 2023-03-08 DIAGNOSIS — M25.561 RIGHT KNEE PAIN, UNSPECIFIED CHRONICITY: ICD-10-CM

## 2023-03-08 DIAGNOSIS — M17.0 PRIMARY OSTEOARTHRITIS OF BOTH KNEES: ICD-10-CM

## 2023-03-08 DIAGNOSIS — M17.12 PRIMARY OSTEOARTHRITIS OF LEFT KNEE: ICD-10-CM

## 2023-03-08 DIAGNOSIS — M17.12 PRIMARY LOCALIZED OSTEOARTHROSIS OF THE KNEE, LEFT: Primary | ICD-10-CM

## 2023-03-08 DIAGNOSIS — M25.561 RIGHT KNEE PAIN, UNSPECIFIED CHRONICITY: Primary | ICD-10-CM

## 2023-03-08 PROCEDURE — 3077F PR MOST RECENT SYSTOLIC BLOOD PRESSURE >= 140 MM HG: ICD-10-PCS | Mod: CPTII,,, | Performed by: PHYSICIAN ASSISTANT

## 2023-03-08 PROCEDURE — 1159F PR MEDICATION LIST DOCUMENTED IN MEDICAL RECORD: ICD-10-PCS | Mod: CPTII,,, | Performed by: PHYSICIAN ASSISTANT

## 2023-03-08 PROCEDURE — 1160F PR REVIEW ALL MEDS BY PRESCRIBER/CLIN PHARMACIST DOCUMENTED: ICD-10-PCS | Mod: CPTII,,, | Performed by: PHYSICIAN ASSISTANT

## 2023-03-08 PROCEDURE — 1160F RVW MEDS BY RX/DR IN RCRD: CPT | Mod: CPTII,,, | Performed by: PHYSICIAN ASSISTANT

## 2023-03-08 PROCEDURE — 3080F DIAST BP >= 90 MM HG: CPT | Mod: CPTII,,, | Performed by: PHYSICIAN ASSISTANT

## 2023-03-08 PROCEDURE — 3077F SYST BP >= 140 MM HG: CPT | Mod: CPTII,,, | Performed by: PHYSICIAN ASSISTANT

## 2023-03-08 PROCEDURE — 73564 X-RAY EXAM KNEE 4 OR MORE: CPT | Mod: RT,,, | Performed by: SPECIALIST

## 2023-03-08 PROCEDURE — 3080F PR MOST RECENT DIASTOLIC BLOOD PRESSURE >= 90 MM HG: ICD-10-PCS | Mod: CPTII,,, | Performed by: PHYSICIAN ASSISTANT

## 2023-03-08 PROCEDURE — 3008F BODY MASS INDEX DOCD: CPT | Mod: CPTII,,, | Performed by: PHYSICIAN ASSISTANT

## 2023-03-08 PROCEDURE — 99213 OFFICE O/P EST LOW 20 MIN: CPT | Mod: ,,, | Performed by: PHYSICIAN ASSISTANT

## 2023-03-08 PROCEDURE — 4010F PR ACE/ARB THEARPY RXD/TAKEN: ICD-10-PCS | Mod: CPTII,,, | Performed by: PHYSICIAN ASSISTANT

## 2023-03-08 PROCEDURE — 73564 XR KNEE COMP 4 OR MORE VIEWS RIGHT: ICD-10-PCS | Mod: RT,,, | Performed by: SPECIALIST

## 2023-03-08 PROCEDURE — 3008F PR BODY MASS INDEX (BMI) DOCUMENTED: ICD-10-PCS | Mod: CPTII,,, | Performed by: PHYSICIAN ASSISTANT

## 2023-03-08 PROCEDURE — 1159F MED LIST DOCD IN RCRD: CPT | Mod: CPTII,,, | Performed by: PHYSICIAN ASSISTANT

## 2023-03-08 PROCEDURE — 99213 PR OFFICE/OUTPT VISIT, EST, LEVL III, 20-29 MIN: ICD-10-PCS | Mod: ,,, | Performed by: PHYSICIAN ASSISTANT

## 2023-03-08 PROCEDURE — 4010F ACE/ARB THERAPY RXD/TAKEN: CPT | Mod: CPTII,,, | Performed by: PHYSICIAN ASSISTANT

## 2023-03-08 RX ORDER — LOSARTAN POTASSIUM 25 MG/1
25 TABLET ORAL DAILY
COMMUNITY

## 2023-03-08 NOTE — PROGRESS NOTES
"Chief Complaint:   Chief Complaint   Patient presents with    Follow-up     jonn knee pain. left knee had an injection in 07/22 with relief. right knee has been ongoing for about 4 mths states when she steps it feels tight/sore       History of present illness:    This is a 62 y.o. year old female who complains of bilateral knee pain L>R  Would like to discuss injections and visco-supplementation    Review of Systems:    Constitution:   Denies chills, fever, and sweats.  HENT:   Denies headaches or blurry vision.  Cardiovascular:  Denies chest pain or irregular heart beat.  Respiratory:   Denies cough or shortness of breath.  Gastrointestinal:  Denies abdominal pain, nausea, or vomiting.  Musculoskeletal:   Denies muscle cramps.  Neurological:   Denies dizziness or focal weakness.  Psychiatric/Behavior: Normal mental status.  Hematology/Lymph:  Denies bleeding problem or easy bruising/bleeding.  Skin:    Denies rash or suspicious lesions.    Examination:    Vital Signs:    Vitals:    03/08/23 1049   BP: (!) 147/90   Pulse: 66   Weight: 91.4 kg (201 lb 9.6 oz)   Height: 5' 1" (1.549 m)   PainSc:   8       Body mass index is 38.09 kg/m².    Constitution:   Well-developed, well nourished patient in no acute distress.  Neurological:   Alert and oriented x 3 and cooperative to examination.     Psychiatric/Behavior: Normal mental status.  Respiratory:   No shortness of breath.  Eyes:    Extraoccular muscles intact  Skin:    No scars, rash or suspicious lesions.    Physical Exam:       General Musculoskeletal Exam   Gait: antalgic       bilateral Knee Exam     Inspection   Erythema: absent  Effusion: present  Deformity: present  Bruising: absent    Tenderness   The patient is tender to palpation of the medial joint line    Crepitus   The patient has crepitus with ROM    Range of Motion   Extension: abnormal   Flexion: abnormal   5-125    Tests   Meniscus   Jordan:  negative  Ligament Examination   MCL - Valgus: normal (0 " to 2mm)  LCL - Varus: normal  Patella   Passive Patellar Tilt: neutral    Other   Sensation: normal    Comments:  varus deformity    Muscle Strength   Right Lower Extremity   Quadriceps:  5/5   Hamstrin/5     Vascular Exam     Right Pulses  Dorsalis Pedis:      2+  Posterior Tibial:      2+      Imaging: X-rays ordered and images interpreted today personally by me of bilateral knees  Bone on bone medial compartment  Varus alignment  Osteophyte formation        Assessment: Right knee pain, unspecified chronicity  -     X-Ray Knee Complete 4 Or More Views Right; Future; Expected date: 2023    Primary osteoarthritis of left knee         Plan:  will get authorization for Synvsic left knee          DISCLAIMER: This note may have been dictated using voice recognition software and may contain grammatical errors.     NOTE: Consult report sent to referring provider via [employer].

## 2023-03-23 ENCOUNTER — OFFICE VISIT (OUTPATIENT)
Dept: ORTHOPEDICS | Facility: CLINIC | Age: 63
End: 2023-03-23
Payer: COMMERCIAL

## 2023-03-23 VITALS — WEIGHT: 201 LBS | BODY MASS INDEX: 37.95 KG/M2 | HEIGHT: 61 IN

## 2023-03-23 DIAGNOSIS — M17.12 PRIMARY OSTEOARTHRITIS OF LEFT KNEE: Primary | ICD-10-CM

## 2023-03-23 PROCEDURE — 20610 DRAIN/INJ JOINT/BURSA W/O US: CPT | Mod: LT,,, | Performed by: PHYSICIAN ASSISTANT

## 2023-03-23 PROCEDURE — 3008F PR BODY MASS INDEX (BMI) DOCUMENTED: ICD-10-PCS | Mod: CPTII,,, | Performed by: PHYSICIAN ASSISTANT

## 2023-03-23 PROCEDURE — 1159F PR MEDICATION LIST DOCUMENTED IN MEDICAL RECORD: ICD-10-PCS | Mod: CPTII,,, | Performed by: PHYSICIAN ASSISTANT

## 2023-03-23 PROCEDURE — 4010F ACE/ARB THERAPY RXD/TAKEN: CPT | Mod: CPTII,,, | Performed by: PHYSICIAN ASSISTANT

## 2023-03-23 PROCEDURE — 1160F PR REVIEW ALL MEDS BY PRESCRIBER/CLIN PHARMACIST DOCUMENTED: ICD-10-PCS | Mod: CPTII,,, | Performed by: PHYSICIAN ASSISTANT

## 2023-03-23 PROCEDURE — 99499 NO LOS: ICD-10-PCS | Mod: ,,, | Performed by: PHYSICIAN ASSISTANT

## 2023-03-23 PROCEDURE — 1160F RVW MEDS BY RX/DR IN RCRD: CPT | Mod: CPTII,,, | Performed by: PHYSICIAN ASSISTANT

## 2023-03-23 PROCEDURE — 1159F MED LIST DOCD IN RCRD: CPT | Mod: CPTII,,, | Performed by: PHYSICIAN ASSISTANT

## 2023-03-23 PROCEDURE — 20610 LARGE JOINT ASPIRATION/INJECTION: L KNEE: ICD-10-PCS | Mod: LT,,, | Performed by: PHYSICIAN ASSISTANT

## 2023-03-23 PROCEDURE — 3008F BODY MASS INDEX DOCD: CPT | Mod: CPTII,,, | Performed by: PHYSICIAN ASSISTANT

## 2023-03-23 PROCEDURE — 4010F PR ACE/ARB THEARPY RXD/TAKEN: ICD-10-PCS | Mod: CPTII,,, | Performed by: PHYSICIAN ASSISTANT

## 2023-03-23 PROCEDURE — 99499 UNLISTED E&M SERVICE: CPT | Mod: ,,, | Performed by: PHYSICIAN ASSISTANT

## 2023-03-23 RX ADMIN — LIDOCAINE HYDROCHLORIDE 2 ML: 20 INJECTION, SOLUTION INFILTRATION; PERINEURAL at 10:03

## 2023-03-23 NOTE — PROCEDURES
Large Joint Aspiration/Injection: L knee    Date/Time: 3/23/2023 10:45 AM  Performed by: Brian Finnegan PA-C  Authorized by: Brian Finnegan PA-C     Consent Done?:  Yes (Verbal)  Indications:  Pain  Site marked: the procedure site was marked    Timeout: prior to procedure the correct patient, procedure, and site was verified    Prep: patient was prepped and draped in usual sterile fashion      Local anesthesia used?: Yes    Local anesthetic:  Topical anesthetic and lidocaine 2% without epinephrine  Ultrasonic Guidance for needle placement?: No    Approach:  Superior  Location:  Knee  Site:  L knee  Medications:  2 mL LIDOcaine HCL 20 mg/ml (2%) 20 mg/mL (2 %); 16 mg hyaluronate 16 mg/2 mL  Patient tolerance:  Patient tolerated the procedure well with no immediate complications

## 2023-03-23 NOTE — PROGRESS NOTES
"Chief Complaint:   Chief Complaint   Patient presents with    Injections     #1 left knee synvisc series. states still having some stiffness.       History of present illness:    This is a 62 y.o. year old female who complains of left knee pain    Review of Systems:    Constitution:   Denies chills, fever, and sweats.  HENT:   Denies headaches or blurry vision.  Cardiovascular:  Denies chest pain or irregular heart beat.  Respiratory:   Denies cough or shortness of breath.  Gastrointestinal:  Denies abdominal pain, nausea, or vomiting.  Musculoskeletal:   Denies muscle cramps.  Neurological:   Denies dizziness or focal weakness.  Psychiatric/Behavior: Normal mental status.  Hematology/Lymph:  Denies bleeding problem or easy bruising/bleeding.  Skin:    Denies rash or suspicious lesions.    Examination:    Vital Signs:    Vitals:    03/23/23 1045   Weight: 91.2 kg (201 lb)   Height: 5' 1" (1.549 m)       Body mass index is 37.98 kg/m².    Constitution:   Well-developed, well nourished patient in no acute distress.  Neurological:   Alert and oriented x 3 and cooperative to examination.     Psychiatric/Behavior: Normal mental status.  Respiratory:   No shortness of breath.  Eyes:    Extraoccular muscles intact  Skin:    No scars, rash or suspicious lesions.    Physical Exam:   Patient presents today for 1st visco-supplementation injection of the left knee      After verbal consent was obtained, the patient's knee was prepped and sterilely injected with Visco-supplementation.  Band-aid placed.  Patient did well following injection.           Assessment: Primary osteoarthritis of left knee         Plan:  f/u 1 week          DISCLAIMER: This note may have been dictated using voice recognition software and may contain grammatical errors.     NOTE: Consult report sent to referring provider via [employer] [facility].  "

## 2023-03-24 RX ORDER — LIDOCAINE HYDROCHLORIDE 20 MG/ML
2 INJECTION, SOLUTION INFILTRATION; PERINEURAL
Status: DISCONTINUED | OUTPATIENT
Start: 2023-03-23 | End: 2023-03-24 | Stop reason: HOSPADM

## 2023-03-30 ENCOUNTER — OFFICE VISIT (OUTPATIENT)
Dept: ORTHOPEDICS | Facility: CLINIC | Age: 63
End: 2023-03-30
Payer: COMMERCIAL

## 2023-03-30 VITALS — WEIGHT: 201 LBS | HEIGHT: 61 IN | BODY MASS INDEX: 37.95 KG/M2

## 2023-03-30 DIAGNOSIS — M17.12 PRIMARY OSTEOARTHRITIS OF LEFT KNEE: Primary | ICD-10-CM

## 2023-03-30 PROCEDURE — 4010F PR ACE/ARB THEARPY RXD/TAKEN: ICD-10-PCS | Mod: CPTII,,, | Performed by: PHYSICIAN ASSISTANT

## 2023-03-30 PROCEDURE — 4010F ACE/ARB THERAPY RXD/TAKEN: CPT | Mod: CPTII,,, | Performed by: PHYSICIAN ASSISTANT

## 2023-03-30 PROCEDURE — 20610 LARGE JOINT ASPIRATION/INJECTION: L KNEE: ICD-10-PCS | Mod: LT,,, | Performed by: PHYSICIAN ASSISTANT

## 2023-03-30 PROCEDURE — 3008F PR BODY MASS INDEX (BMI) DOCUMENTED: ICD-10-PCS | Mod: CPTII,,, | Performed by: PHYSICIAN ASSISTANT

## 2023-03-30 PROCEDURE — 1159F MED LIST DOCD IN RCRD: CPT | Mod: CPTII,,, | Performed by: PHYSICIAN ASSISTANT

## 2023-03-30 PROCEDURE — 1159F PR MEDICATION LIST DOCUMENTED IN MEDICAL RECORD: ICD-10-PCS | Mod: CPTII,,, | Performed by: PHYSICIAN ASSISTANT

## 2023-03-30 PROCEDURE — 99499 UNLISTED E&M SERVICE: CPT | Mod: ,,, | Performed by: PHYSICIAN ASSISTANT

## 2023-03-30 PROCEDURE — 20610 DRAIN/INJ JOINT/BURSA W/O US: CPT | Mod: LT,,, | Performed by: PHYSICIAN ASSISTANT

## 2023-03-30 PROCEDURE — 3008F BODY MASS INDEX DOCD: CPT | Mod: CPTII,,, | Performed by: PHYSICIAN ASSISTANT

## 2023-03-30 PROCEDURE — 1160F PR REVIEW ALL MEDS BY PRESCRIBER/CLIN PHARMACIST DOCUMENTED: ICD-10-PCS | Mod: CPTII,,, | Performed by: PHYSICIAN ASSISTANT

## 2023-03-30 PROCEDURE — 1160F RVW MEDS BY RX/DR IN RCRD: CPT | Mod: CPTII,,, | Performed by: PHYSICIAN ASSISTANT

## 2023-03-30 PROCEDURE — 99499 NO LOS: ICD-10-PCS | Mod: ,,, | Performed by: PHYSICIAN ASSISTANT

## 2023-03-30 RX ORDER — LIDOCAINE HYDROCHLORIDE 20 MG/ML
2 INJECTION, SOLUTION INFILTRATION; PERINEURAL
Status: DISCONTINUED | OUTPATIENT
Start: 2023-03-30 | End: 2023-03-30 | Stop reason: HOSPADM

## 2023-03-30 RX ADMIN — LIDOCAINE HYDROCHLORIDE 2 ML: 20 INJECTION, SOLUTION INFILTRATION; PERINEURAL at 10:03

## 2023-03-30 NOTE — PROCEDURES
Large Joint Aspiration/Injection: L knee    Date/Time: 3/30/2023 10:45 AM  Performed by: Brian Finnegan PA-C  Authorized by: Brian Finnegan PA-C     Consent Done?:  Yes (Verbal)  Indications:  Pain  Site marked: the procedure site was marked    Timeout: prior to procedure the correct patient, procedure, and site was verified    Prep: patient was prepped and draped in usual sterile fashion      Local anesthesia used?: Yes    Local anesthetic:  Topical anesthetic and lidocaine 2% without epinephrine  Ultrasonic Guidance for needle placement?: No    Approach:  Superior  Location:  Knee  Site:  L knee  Medications:  2 mL LIDOcaine HCL 20 mg/ml (2%) 20 mg/mL (2 %); 16 mg hyaluronate 16 mg/2 mL  Patient tolerance:  Patient tolerated the procedure well with no immediate complications

## 2023-03-30 NOTE — PROGRESS NOTES
Patient presents today for 2nd visco-supplementation injection of the left knee      After verbal consent was obtained, the patient's knee was prepped and sterilely injected with Visco-supplementation.  Band-aid placed.  Patient did well following injection.

## 2023-04-06 ENCOUNTER — OFFICE VISIT (OUTPATIENT)
Dept: ORTHOPEDICS | Facility: CLINIC | Age: 63
End: 2023-04-06
Payer: COMMERCIAL

## 2023-04-06 VITALS — BODY MASS INDEX: 37.95 KG/M2 | WEIGHT: 201 LBS | HEIGHT: 61 IN

## 2023-04-06 DIAGNOSIS — M17.12 PRIMARY OSTEOARTHRITIS OF LEFT KNEE: Primary | ICD-10-CM

## 2023-04-06 PROCEDURE — 1159F PR MEDICATION LIST DOCUMENTED IN MEDICAL RECORD: ICD-10-PCS | Mod: CPTII,,, | Performed by: PHYSICIAN ASSISTANT

## 2023-04-06 PROCEDURE — 4010F PR ACE/ARB THEARPY RXD/TAKEN: ICD-10-PCS | Mod: CPTII,,, | Performed by: PHYSICIAN ASSISTANT

## 2023-04-06 PROCEDURE — 20610 LARGE JOINT ASPIRATION/INJECTION: L KNEE: ICD-10-PCS | Mod: LT,,, | Performed by: PHYSICIAN ASSISTANT

## 2023-04-06 PROCEDURE — 3008F PR BODY MASS INDEX (BMI) DOCUMENTED: ICD-10-PCS | Mod: CPTII,,, | Performed by: PHYSICIAN ASSISTANT

## 2023-04-06 PROCEDURE — 1160F PR REVIEW ALL MEDS BY PRESCRIBER/CLIN PHARMACIST DOCUMENTED: ICD-10-PCS | Mod: CPTII,,, | Performed by: PHYSICIAN ASSISTANT

## 2023-04-06 PROCEDURE — 3008F BODY MASS INDEX DOCD: CPT | Mod: CPTII,,, | Performed by: PHYSICIAN ASSISTANT

## 2023-04-06 PROCEDURE — 99499 UNLISTED E&M SERVICE: CPT | Mod: ,,, | Performed by: PHYSICIAN ASSISTANT

## 2023-04-06 PROCEDURE — 20610 DRAIN/INJ JOINT/BURSA W/O US: CPT | Mod: LT,,, | Performed by: PHYSICIAN ASSISTANT

## 2023-04-06 PROCEDURE — 99499 NO LOS: ICD-10-PCS | Mod: ,,, | Performed by: PHYSICIAN ASSISTANT

## 2023-04-06 PROCEDURE — 4010F ACE/ARB THERAPY RXD/TAKEN: CPT | Mod: CPTII,,, | Performed by: PHYSICIAN ASSISTANT

## 2023-04-06 PROCEDURE — 1159F MED LIST DOCD IN RCRD: CPT | Mod: CPTII,,, | Performed by: PHYSICIAN ASSISTANT

## 2023-04-06 PROCEDURE — 1160F RVW MEDS BY RX/DR IN RCRD: CPT | Mod: CPTII,,, | Performed by: PHYSICIAN ASSISTANT

## 2023-04-06 RX ORDER — LIDOCAINE HYDROCHLORIDE 20 MG/ML
2 INJECTION, SOLUTION INFILTRATION; PERINEURAL
Status: SHIPPED | OUTPATIENT
Start: 2023-04-06

## 2023-04-06 RX ADMIN — LIDOCAINE HYDROCHLORIDE 2 ML: 20 INJECTION, SOLUTION INFILTRATION; PERINEURAL at 10:04

## 2023-04-06 NOTE — PROCEDURES
Large Joint Aspiration/Injection: L knee    Date/Time: 4/6/2023 10:45 AM  Performed by: Brian Finnegan PA-C  Authorized by: Brian Finnegan PA-C     Consent Done?:  Yes (Verbal)  Indications:  Pain  Site marked: the procedure site was marked    Timeout: prior to procedure the correct patient, procedure, and site was verified    Prep: patient was prepped and draped in usual sterile fashion      Local anesthesia used?: Yes    Local anesthetic:  Topical anesthetic and lidocaine 2% without epinephrine  Ultrasonic Guidance for needle placement?: No    Approach:  Superior  Location:  Knee  Site:  L knee  Medications:  2 mL LIDOcaine HCL 20 mg/ml (2%) 20 mg/mL (2 %); 16 mg hyaluronate 16 mg/2 mL  Patient tolerance:  Patient tolerated the procedure well with no immediate complications

## 2025-07-16 ENCOUNTER — HOSPITAL ENCOUNTER (OUTPATIENT)
Dept: RADIOLOGY | Facility: CLINIC | Age: 65
Discharge: HOME | End: 2025-07-16
Attending: PHYSICIAN ASSISTANT
Payer: MEDICARE

## 2025-07-16 ENCOUNTER — OFFICE VISIT (OUTPATIENT)
Dept: ORTHOPEDICS | Facility: CLINIC | Age: 65
End: 2025-07-16
Payer: MEDICARE

## 2025-07-16 VITALS — DIASTOLIC BLOOD PRESSURE: 94 MMHG | SYSTOLIC BLOOD PRESSURE: 155 MMHG

## 2025-07-16 DIAGNOSIS — M25.562 PAIN IN BOTH KNEES, UNSPECIFIED CHRONICITY: ICD-10-CM

## 2025-07-16 DIAGNOSIS — M17.12 PRIMARY OSTEOARTHRITIS OF LEFT KNEE: ICD-10-CM

## 2025-07-16 DIAGNOSIS — M17.11 PRIMARY OSTEOARTHRITIS OF RIGHT KNEE: ICD-10-CM

## 2025-07-16 DIAGNOSIS — M25.562 PAIN IN BOTH KNEES, UNSPECIFIED CHRONICITY: Primary | ICD-10-CM

## 2025-07-16 DIAGNOSIS — M25.561 PAIN IN BOTH KNEES, UNSPECIFIED CHRONICITY: Primary | ICD-10-CM

## 2025-07-16 DIAGNOSIS — M25.561 PAIN IN BOTH KNEES, UNSPECIFIED CHRONICITY: ICD-10-CM

## 2025-07-16 DIAGNOSIS — M17.0 PRIMARY OSTEOARTHRITIS OF BOTH KNEES: Primary | ICD-10-CM

## 2025-07-16 PROCEDURE — 73564 X-RAY EXAM KNEE 4 OR MORE: CPT | Mod: 50,,, | Performed by: PHYSICIAN ASSISTANT

## 2025-07-16 PROCEDURE — 99214 OFFICE O/P EST MOD 30 MIN: CPT | Mod: 25,,, | Performed by: PHYSICIAN ASSISTANT

## 2025-07-16 PROCEDURE — 20610 DRAIN/INJ JOINT/BURSA W/O US: CPT | Mod: 50,,, | Performed by: PHYSICIAN ASSISTANT

## 2025-07-16 RX ORDER — METHYLPREDNISOLONE 4 MG
1 TABLET, DOSE PACK ORAL DAILY
COMMUNITY
Start: 2022-07-11

## 2025-07-16 RX ADMIN — BETAMETHASONE SODIUM PHOSPHATE AND BETAMETHASONE ACETATE 12 MG: 3; 3 INJECTION, SUSPENSION INTRA-ARTICULAR; INTRALESIONAL; INTRAMUSCULAR; SOFT TISSUE at 09:07

## 2025-07-16 RX ADMIN — LIDOCAINE HYDROCHLORIDE 2 MG: 20 INJECTION, SOLUTION INFILTRATION; PERINEURAL at 09:07

## 2025-07-29 RX ORDER — LIDOCAINE HYDROCHLORIDE 20 MG/ML
2 INJECTION, SOLUTION INFILTRATION; PERINEURAL
Status: DISCONTINUED | OUTPATIENT
Start: 2025-07-16 | End: 2025-07-29 | Stop reason: HOSPADM

## 2025-07-29 RX ORDER — BETAMETHASONE SODIUM PHOSPHATE AND BETAMETHASONE ACETATE 3; 3 MG/ML; MG/ML
12 INJECTION, SUSPENSION INTRA-ARTICULAR; INTRALESIONAL; INTRAMUSCULAR; SOFT TISSUE
Status: DISCONTINUED | OUTPATIENT
Start: 2025-07-16 | End: 2025-07-29 | Stop reason: HOSPADM

## 2025-07-29 NOTE — PROGRESS NOTES
Chief Complaint:   Chief Complaint   Patient presents with    Knee Pain     Sergei knee - hx of L knee synvisc series with relief 2023- reports increase pain in the R knee- requesting steroid in both knee.        History of present illness:    This is a 65 y.o. year old   History of Present Illness    CHIEF COMPLAINT:  - Bilateral knee pain    HPI:  Beulah presents for follow-up of bilateral knee pain, with the right knee reported as worse. She describes deep pain in the knees, particularly on the right side. She uses Advil or Ibuprofen occasionally for pain management, but not routinely. 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 She also mentions hip discomfort, which she attributes to her altered gait due to knee issues. She has a history of riding horses and competing, which occasionally caused hip discomfort, but she has not noticed any new hip symptoms.    For exercise, she rides a bike every morning but does not walk long distances.    She denies being diabetic or taking any regular medications for blood sugar management. 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    losartan (COZAAR) 25 MG tablet Take 25 mg by mouth once daily.    metoprolol succinate (TOPROL-XL) 50 MG 24 hr tablet Take 50 mg by mouth once daily.    pravastatin (PRAVACHOL) 20 MG tablet Take 20 mg by mouth once daily.    triamterene-hydrochlorothiazide 75-50 mg (MAXZIDE) 75-50 mg  "per tablet Take 1 tablet by mouth once daily.     No current facility-administered medications for this visit.     Facility-Administered Medications Ordered in Other Visits   Medication    hyaluronate (SYNVISC) 16 mg/2 mL injection 16 mg    LIDOcaine HCL 20 mg/ml (2%) injection 2 mL       Review of Systems:    Constitution:   Denies chills, fever, and sweats.  HENT:   Denies headaches or blurry vision.  Cardiovascular:  Denies chest pain or irregular heart beat.  Respiratory:   Denies cough or shortness of breath.  Gastrointestinal:  Denies abdominal pain, nausea, or vomiting.  Musculoskeletal:   Denies muscle cramps.  Neurological:   Denies dizziness or focal weakness.  Psychiatric/Behavior: Normal mental status.  Hematology/Lymph:  Denies bleeding problem or easy bruising/bleeding.  Skin:    Denies rash or suspicious lesions.    Examination:    Vital Signs:    Vitals:    07/16/25 1002   BP: (!) 155/94   Weight: (P) 88.9 kg (196 lb)   Height: (P) 5' 1" (1.549 m)       Body mass index is 37.03 kg/m² (pended).    Constitution:   Well-developed, well nourished patient in no acute distress.  Neurological:   Alert and oriented x 3 and cooperative to examination.     Psychiatric/Behavior: Normal mental status.  Respiratory:   No shortness of breath.  Eyes:    Extraoccular muscles intact  Skin:    No scars, rash or suspicious lesions.    Physical Exam:   bilateral Knee     Grade effusion 1    No erythema, warmth bruising     active flexion 120   active extension 5    Tender over medial joint line  Tender over MCL   No lateral compartment tenderness   Negative  Jordan test   Negative  lachman test   No instability on varus or valgus stress   No weakness of the  knee was observed.    Imaging: X-rays ordered and images interpreted today personally by me of bilateral knees 4 sign patient has bone-on-bone contact medial compartments of both knees.    She has a significant varus deformity.    Extra-articular osteophytes " noted.    No acute osseous abnormalities        Assessment: Pain in both knees, unspecified chronicity  -     X-Ray Knee Complete 4 Or More Views Bilat; Future; Expected date: 07/16/2025         Plan:    Assessment & Plan    BILATERAL OSTEOARTHRITIS OF KNEE:  - Administered cortisone injections to both knees during the visit.  - Discussed potential gel shots for the knees, pending insurance authorization.  - Gel shots process: series of 3 injections, once a week for 3 weeks.  - Resume normal activities, including bike riding, the day after cortisone injections.  - Ice knees a few times in the 24 hours following cortisone injections.  - Contact the office if there are any issues with insurance authorization for gel shots.    FOLLOW-UP:  - Follow up in 1 month.                This note was generated with the assistance of ambient listening technology. Verbal consent was obtained by the patient and accompanying visitor(s) for the recording of patient appointment to facilitate this note. I attest to having reviewed and edited the generated note for accuracy, though some syntax or spelling errors may persist. Please contact the author of this note for any clarification.       DISCLAIMER: This note may have been dictated using voice recognition software and may contain grammatical errors.     NOTE: Consult report sent to referring provider via [employer] [facility].

## 2025-07-29 NOTE — PROCEDURES
Large Joint Aspiration/Injection: bilateral knee    Date/Time: 7/16/2025 9:45 AM    Performed by: Brian Finnegan PA-C  Authorized by: Brian Finnegan PA-C    Timeout: prior to procedure the correct patient, procedure, and site was verified    Prep: patient was prepped and draped in usual sterile fashion      Local anesthesia used?: Yes    Local anesthetic:  Lidocaine 2% without epinephrine    Details:  Needle Size:  25 G  Ultrasonic Guidance for needle placement?: No    Location:  Knee  Laterality:  Bilateral  Site:  Bilateral knee  Medications (Right):  2 mg LIDOcaine HCL 20 mg/ml (2%) 20 mg/mL (2 %); 12 mg betamethasone acetate-betamethasone sodium phosphate 6 mg/mL  Medications (Left):  2 mg LIDOcaine HCL 20 mg/ml (2%) 20 mg/mL (2 %); 12 mg betamethasone acetate-betamethasone sodium phosphate 6 mg/mL  Patient tolerance:  Patient tolerated the procedure well with no immediate complications

## 2025-08-20 ENCOUNTER — OFFICE VISIT (OUTPATIENT)
Dept: ORTHOPEDICS | Facility: CLINIC | Age: 65
End: 2025-08-20
Payer: MEDICARE

## 2025-08-20 DIAGNOSIS — M17.12 PRIMARY OSTEOARTHRITIS OF LEFT KNEE: Primary | ICD-10-CM

## 2025-08-20 DIAGNOSIS — M17.11 PRIMARY OSTEOARTHRITIS OF RIGHT KNEE: ICD-10-CM

## 2025-08-20 PROCEDURE — 20610 DRAIN/INJ JOINT/BURSA W/O US: CPT | Mod: 50,,, | Performed by: PHYSICIAN ASSISTANT

## 2025-08-20 PROCEDURE — 99499 UNLISTED E&M SERVICE: CPT | Mod: ,,, | Performed by: PHYSICIAN ASSISTANT

## 2025-08-20 RX ADMIN — LIDOCAINE HYDROCHLORIDE 2 ML: 20 INJECTION, SOLUTION INFILTRATION; PERINEURAL at 10:08

## 2025-08-22 RX ORDER — LIDOCAINE HYDROCHLORIDE 20 MG/ML
2 INJECTION, SOLUTION INFILTRATION; PERINEURAL
Status: DISCONTINUED | OUTPATIENT
Start: 2025-08-20 | End: 2025-08-22 | Stop reason: HOSPADM

## 2025-08-27 ENCOUNTER — OFFICE VISIT (OUTPATIENT)
Dept: ORTHOPEDICS | Facility: CLINIC | Age: 65
End: 2025-08-27
Payer: MEDICARE

## 2025-08-27 DIAGNOSIS — M17.12 PRIMARY OSTEOARTHRITIS OF LEFT KNEE: Primary | ICD-10-CM

## 2025-08-27 DIAGNOSIS — M17.11 PRIMARY OSTEOARTHRITIS OF RIGHT KNEE: ICD-10-CM

## 2025-08-27 PROCEDURE — 99499 UNLISTED E&M SERVICE: CPT | Mod: ,,, | Performed by: PHYSICIAN ASSISTANT

## 2025-08-27 PROCEDURE — 20610 DRAIN/INJ JOINT/BURSA W/O US: CPT | Mod: 50,,, | Performed by: PHYSICIAN ASSISTANT

## 2025-08-27 RX ADMIN — LIDOCAINE HYDROCHLORIDE 2 ML: 20 INJECTION, SOLUTION INFILTRATION; PERINEURAL at 10:08

## 2025-08-29 RX ORDER — LIDOCAINE HYDROCHLORIDE 20 MG/ML
2 INJECTION, SOLUTION INFILTRATION; PERINEURAL
Status: DISCONTINUED | OUTPATIENT
Start: 2025-08-27 | End: 2025-08-29 | Stop reason: HOSPADM

## 2025-09-03 ENCOUNTER — OFFICE VISIT (OUTPATIENT)
Dept: ORTHOPEDICS | Facility: CLINIC | Age: 65
End: 2025-09-03
Payer: MEDICARE

## 2025-09-03 VITALS — DIASTOLIC BLOOD PRESSURE: 82 MMHG | SYSTOLIC BLOOD PRESSURE: 142 MMHG

## 2025-09-03 DIAGNOSIS — M17.12 PRIMARY OSTEOARTHRITIS OF LEFT KNEE: Primary | ICD-10-CM

## 2025-09-03 DIAGNOSIS — M17.11 PRIMARY OSTEOARTHRITIS OF RIGHT KNEE: ICD-10-CM

## 2025-09-03 PROCEDURE — 20610 DRAIN/INJ JOINT/BURSA W/O US: CPT | Mod: 50,,, | Performed by: PHYSICIAN ASSISTANT

## 2025-09-03 PROCEDURE — 99499 UNLISTED E&M SERVICE: CPT | Mod: ,,, | Performed by: PHYSICIAN ASSISTANT

## 2025-09-03 RX ORDER — LIDOCAINE HYDROCHLORIDE 20 MG/ML
2 INJECTION, SOLUTION INFILTRATION; PERINEURAL
Status: DISCONTINUED | OUTPATIENT
Start: 2025-09-03 | End: 2025-09-03 | Stop reason: HOSPADM

## 2025-09-03 RX ADMIN — LIDOCAINE HYDROCHLORIDE 2 ML: 20 INJECTION, SOLUTION INFILTRATION; PERINEURAL at 10:09

## (undated) DEVICE — UNDERPAD DISPOSABLE 30X30IN

## (undated) DEVICE — KIT BIOGUARD AIR WTR SUC VALVE

## (undated) DEVICE — TIP YANKAUERS BULB NO VENT

## (undated) DEVICE — LINER SUCTION CANNISTER REGUGA

## (undated) DEVICE — SPONGE DRY VIA GREEN

## (undated) DEVICE — GOWN SURGICAL BRTHBL XL

## (undated) DEVICE — ELECTRODE FOAM 535 TEARDROP

## (undated) DEVICE — TUBING OXYGEN CONNECT BUBBLE

## (undated) DEVICE — KIT VIA CUSTOM PROCEDURE

## (undated) DEVICE — TUBE SUC UNIVERSAL .25XIN 6FT

## (undated) DEVICE — SOL IRRI STRL WATER 1000ML